# Patient Record
Sex: FEMALE | Race: WHITE | Employment: PART TIME
[De-identification: names, ages, dates, MRNs, and addresses within clinical notes are randomized per-mention and may not be internally consistent; named-entity substitution may affect disease eponyms.]

---

## 2017-02-08 ENCOUNTER — PATIENT MESSAGE (OUTPATIENT)
Dept: INTERNAL MEDICINE | Facility: CLINIC | Age: 37
End: 2017-02-08

## 2017-02-09 RX ORDER — ZOLPIDEM TARTRATE 10 MG/1
10 TABLET ORAL NIGHTLY PRN
Qty: 30 TABLET | Refills: 0 | Status: SHIPPED | OUTPATIENT
Start: 2017-02-09 | End: 2017-02-22

## 2017-03-07 ENCOUNTER — PATIENT MESSAGE (OUTPATIENT)
Dept: INTERNAL MEDICINE | Facility: CLINIC | Age: 37
End: 2017-03-07

## 2017-03-07 DIAGNOSIS — G47.00 INSOMNIA, UNSPECIFIED TYPE: Primary | ICD-10-CM

## 2017-03-07 RX ORDER — ZOLPIDEM TARTRATE 10 MG/1
TABLET ORAL
COMMUNITY
Start: 2017-01-03 | End: 2017-03-08 | Stop reason: SDUPTHER

## 2017-03-07 RX ORDER — ZOLPIDEM TARTRATE 10 MG/1
10 TABLET ORAL NIGHTLY PRN
Qty: 30 TABLET | Refills: 2 | Status: CANCELLED | OUTPATIENT
Start: 2017-03-07 | End: 2017-04-06

## 2017-03-08 RX ORDER — ZOLPIDEM TARTRATE 10 MG/1
TABLET ORAL
OUTPATIENT
Start: 2017-03-08

## 2017-03-08 RX ORDER — ZOLPIDEM TARTRATE 10 MG/1
10 TABLET ORAL NIGHTLY PRN
Qty: 30 TABLET | Refills: 0 | Status: SHIPPED | OUTPATIENT
Start: 2017-03-08 | End: 2017-04-02 | Stop reason: SDUPTHER

## 2017-03-10 ENCOUNTER — PATIENT MESSAGE (OUTPATIENT)
Dept: INTERNAL MEDICINE | Facility: CLINIC | Age: 37
End: 2017-03-10

## 2017-04-02 DIAGNOSIS — G47.00 INSOMNIA, UNSPECIFIED TYPE: ICD-10-CM

## 2017-04-03 ENCOUNTER — PATIENT MESSAGE (OUTPATIENT)
Dept: INTERNAL MEDICINE | Facility: CLINIC | Age: 37
End: 2017-04-03

## 2017-04-04 ENCOUNTER — PATIENT MESSAGE (OUTPATIENT)
Dept: PRIMARY CARE CLINIC | Age: 37
End: 2017-04-04

## 2017-04-04 RX ORDER — ZOLPIDEM TARTRATE 10 MG/1
TABLET ORAL
Qty: 30 TABLET | Refills: 0 | Status: SHIPPED | OUTPATIENT
Start: 2017-04-04

## 2018-03-15 ENCOUNTER — TELEPHONE (OUTPATIENT)
Dept: NEUROLOGY | Age: 38
End: 2018-03-15

## 2018-03-15 ENCOUNTER — OFFICE VISIT (OUTPATIENT)
Dept: NEUROLOGY | Age: 38
End: 2018-03-15
Payer: COMMERCIAL

## 2018-03-15 VITALS
WEIGHT: 133 LBS | BODY MASS INDEX: 23.57 KG/M2 | HEART RATE: 66 BPM | SYSTOLIC BLOOD PRESSURE: 125 MMHG | HEIGHT: 63 IN | DIASTOLIC BLOOD PRESSURE: 86 MMHG

## 2018-03-15 DIAGNOSIS — R51.9 ACUTE INTRACTABLE HEADACHE, UNSPECIFIED HEADACHE TYPE: ICD-10-CM

## 2018-03-15 DIAGNOSIS — S06.340S TRAUMATIC HEMORRHAGE OF RIGHT CEREBRUM WITHOUT LOSS OF CONSCIOUSNESS, SEQUELA (HCC): Primary | ICD-10-CM

## 2018-03-15 PROCEDURE — 99204 OFFICE O/P NEW MOD 45 MIN: CPT | Performed by: PSYCHIATRY & NEUROLOGY

## 2018-03-15 RX ORDER — ALPRAZOLAM 0.5 MG/1
0.5 TABLET ORAL NIGHTLY PRN
COMMUNITY

## 2018-03-15 RX ORDER — METHYLPREDNISOLONE 4 MG/1
TABLET ORAL
Qty: 1 KIT | Refills: 0 | Status: SHIPPED | OUTPATIENT
Start: 2018-03-15 | End: 2018-03-21

## 2018-03-15 NOTE — PROGRESS NOTES
showed a thrombus in the right cortical vein of Trolard. The patient was started on a heparin drip and was recommended to start anticoagulation with warfarin. She was also prophylactically placed on Keppra by neurosurgery. Patient wanted to pursue a 2nd opinion, and was transferred to the [facility]. Her images from [facility] were reviewed there. She was found have some slowing of cortical venous drainage in the right hemisphere but no acute thrombus. Clinically, patient reports that she was mostly sedated during her hospital stay because she was getting Dilaudid for her headaches. She was taken off Keppra well at the [facility]. She denies any clinical seizure activity, or any focal neurologic deficits. She does report history of a seizure-like event approximately 10 years ago that was thought to be triggered by Wellbutrin. EEG done at the [facility] did not show any epileptiform discharges. She is otherwise healthy, and has no chronic medical conditions. She does take birth control (Juanis), has been on and off it in the past and has been consistently taking it for the past 6 months. Patient denies any history family historyof aneurysm or connective tissue disease. Currently, patient reports persistent headache, that is 7/10 in severity, currently taking Tylenol with minimal relief. 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     Prior workup:  MRA and MRV without contrast 3/8/2018: No large intracranial aneurysm or high-grade central branch stenosis or occlusion was seen    Very subtle globular prominence at the anterior superior margin of the bifurcation of the right middle cerebral artery may be a artifact from tortuous vessels rather than a small 2 to 3 mm aneurysm given lack of clear visible aneurysm on the previous CT angiogram.  Consider 6 month surveillance scan   with attention to that area    Probable hypoplastic Occupational History    Physician      Dermatology locum     Social History Main Topics    Smoking status: Never Smoker    Smokeless tobacco: Never Used    Alcohol use 0.6 oz/week     1 Glasses of wine per week      Comment: occassionally    Drug use: Yes     Types: Marijuana      Comment: occassionally    Sexual activity: Not Currently     Other Topics Concern    None     Social History Narrative    None       Current Outpatient Prescriptions   Medication Sig Dispense Refill    ALPRAZolam (XANAX) 0.5 MG tablet Take 0.5 mg by mouth nightly as needed for Sleep.  Omeprazole Magnesium (PRILOSEC OTC PO) Take by mouth daily      methylPREDNISolone (MEDROL DOSEPACK) 4 MG tablet Take by mouth as directed 1 kit 0    zolpidem (AMBIEN) 10 MG tablet TAKE 1 TABLET BY MOUTH AT BEDTIME AS NEEDED for sleep 30 tablet 0    traZODone (DESYREL) 150 MG tablet 300 mg      levothyroxine (SYNTHROID) 25 MCG tablet Take 1 tablet by mouth Daily 60 tablet 3    Multiple Vitamins-Minerals (MULTIVITAMIN PO) Take by mouth daily       No current facility-administered medications for this visit.         Allergies   Allergen Reactions    Wellbutrin [Bupropion] Other (See Comments)     seizure            REVIEW OF SYSTEMS    CONSTITUTIONAL Weight: absent, Appetite: absent, Fatigue: absent      HEENT Ears: normal, Visual disturbance: absent   RESPIRATORY Shortness of breath: absent, Cough: absent   CARDIOVASCULAR Chest pain: absent, Leg swelling :absent      GI Constipation: present, Diarrhea: absent, Swallowing change: absent       Urinary frequency: absent, Urinary urgency: absent, Urinary incontinence: absent   MUSCULOSKELETAL Neck pain: absent, Back pain: absent, Stiffness: absent, Muscle pain: absent, Joint pain: absent Restless legs: absent   DERMATOLOGIC Hair loss: absent, Skin changes: absent   NEUROLOGIC Memory loss: absent, Confusion: absent, Seizures: absent Trouble walking or imbalance: absent, Dizziness: present, Proprioception normal.   Pinprick normal.     Gait, Coordination, and Reflexes     Gait  Gait: normal    Coordination   Finger to nose coordination: normal  Heel to shin coordination: normal  Tandem walking coordination: normal    Tremor   Resting tremor: absent  Intention tremor: absent  Action tremor: absent    Reflexes   Right brachioradialis: 2+  Left brachioradialis: 2+  Right biceps: 2+  Left biceps: 2+  Right triceps: 2+  Left triceps: 2+  Right patellar: 1+  Left patellar: 1+  Right achilles: 1+  Left achilles: 1+  Right plantar: normal  Left plantar: normal  Right ankle clonus: absent  Left ankle clonus: absent      Assessment:  Cerebral hemorrhage and contusion, traumatic    Plan: This is a 44 y.o. female who presents with new onset headache, nausea and vomiting. Workup showed hemorrhagic contusions in the inferior frontal lobes as well as the right frontal and temporal lobes, most consistent with trauma. There is also questionable thrombosis of the cortical vein of trolard seen on cerebral angiogram. Review of angiogram images at the [facility] clinic showed no evidence of acute thrombosis. I reviewed the patient's films today, I do not see any evidence of acute thrombosis. Will have our neuro-endovascular service review the films also. Patient would like to resume work as a dermatologist. Her neurologic exam today is normal, advised her that she may return to work next week. She would also like to fly for her friend's wedding in a few weeks, but I have advised her against this given the significant parenchymal swelling on her MRI. Will repeat neuro imaging in about 8-10 weeks. I have sent a prescription for Medrol Dosepak to her pharmacy for her headaches. Signed: Augusto Garza MD    Please note that this chart was generated using voice recognition Dragon dictation software.   Although every effort was made to ensure the accuracy of this automated transcription, some errors in

## 2018-03-16 ENCOUNTER — TELEPHONE (OUTPATIENT)
Dept: NEUROLOGY | Age: 38
End: 2018-03-16

## 2018-03-20 DIAGNOSIS — G08 ACUTE CEREBRAL VENOUS SINUS THROMBOSIS: Primary | ICD-10-CM

## 2018-04-11 ENCOUNTER — TELEPHONE (OUTPATIENT)
Dept: NEUROLOGY | Age: 38
End: 2018-04-11

## 2018-05-18 ENCOUNTER — TELEPHONE (OUTPATIENT)
Dept: NEUROLOGY | Age: 38
End: 2018-05-18

## 2018-05-29 ENCOUNTER — TELEPHONE (OUTPATIENT)
Dept: NEUROLOGY | Age: 38
End: 2018-05-29

## 2019-02-06 ENCOUNTER — APPOINTMENT (OUTPATIENT)
Age: 39
Setting detail: DERMATOLOGY
End: 2019-02-06

## 2019-02-06 DIAGNOSIS — L57.8 OTHER SKIN CHANGES DUE TO CHRONIC EXPOSURE TO NONIONIZING RADIATION: ICD-10-CM

## 2019-02-06 PROCEDURE — OTHER ICON IPL: OTHER

## 2019-02-06 ASSESSMENT — LOCATION SIMPLE DESCRIPTION DERM
LOCATION SIMPLE: RIGHT CHEEK
LOCATION SIMPLE: LEFT CHEEK

## 2019-02-06 ASSESSMENT — LOCATION DETAILED DESCRIPTION DERM
LOCATION DETAILED: LEFT CENTRAL MALAR CHEEK
LOCATION DETAILED: RIGHT CENTRAL MALAR CHEEK

## 2019-02-06 ASSESSMENT — LOCATION ZONE DERM: LOCATION ZONE: FACE

## 2019-02-06 NOTE — HPI: COSMETIC (IPL)
Have You Had Laser Removal Of Brown Spots Before?: has not had previous treatment
When Outside In The Sun, Do You...: mildly burns, tans slowly
Eye Color: sarbjit

## 2019-08-08 ENCOUNTER — APPOINTMENT (OUTPATIENT)
Age: 39
Setting detail: DERMATOLOGY
End: 2019-08-08

## 2019-08-08 DIAGNOSIS — Z41.9 ENCOUNTER FOR PROCEDURE FOR PURPOSES OTHER THAN REMEDYING HEALTH STATE, UNSPECIFIED: ICD-10-CM

## 2019-08-08 PROCEDURE — OTHER LASER HAIR REMOVAL: OTHER

## 2019-08-08 NOTE — HPI: COSMETIC (LASER HAIR REMOVAL)
Eye Color: blue
Have You Had Laser Hair Removal Before?: has not had previous treatment
When Outside In The Sun, Do You...: rarely burns, tans with ease

## 2019-08-08 NOTE — PROCEDURE: LASER HAIR REMOVAL
Fluence (Will Not Render If 0): 23
Pre-Procedure: Prior to proceeding the treatment areas were cleaned and all present put on their eye protection.
Detail Level: Zone
Number Of Prepaid Treatments (Will Not Render If 0): 0
Cooling: chill tip
Laser Type: Graciela Berman
Pulse Duration: 100 ms
Total Pulses: 10
Spot Size: 6 mm
Consent: Written consent obtained, risks reviewed including but not limited to crusting, scabbing, blistering, scarring, darker or lighter pigmentary change, paradoxical hair regrowth, incomplete removal of hair and infection.
Shaving (Optional): The patient was shaved in the office prior to the procedure
Fluence (Will Not Render If 0): 20
Fluence (Will Not Render If 0): 39
Post-Care Instructions: I reviewed with the patient in detail post-care instructions. Patient should avoid sun for a minimum of 4 weeks before and after treatment.
Location Override: sideburns
Render Post-Care In The Note: No
Pulse Duration: 20 ms
Treatment Number: 1
Post-Procedure Care: Immediate endpoint: perifollicular erythema and edema. Aloe and ice applied. Post care reviewed with patient
Tolerated Procedure (Optional): Tolerated Well

## 2019-09-05 ENCOUNTER — APPOINTMENT (OUTPATIENT)
Age: 39
Setting detail: DERMATOLOGY
End: 2019-09-05

## 2019-09-05 ENCOUNTER — RX ONLY (RX ONLY)
Age: 39
End: 2019-09-05

## 2019-09-05 DIAGNOSIS — Z41.9 ENCOUNTER FOR PROCEDURE FOR PURPOSES OTHER THAN REMEDYING HEALTH STATE, UNSPECIFIED: ICD-10-CM

## 2019-09-05 PROCEDURE — OTHER LASER HAIR REMOVAL: OTHER

## 2019-09-05 RX ORDER — TRETINOIN 0.25 MG/G
CREAM TOPICAL
Qty: 1 | Refills: 3 | Status: ERX | COMMUNITY
Start: 2019-09-05

## 2019-09-05 ASSESSMENT — LOCATION DETAILED DESCRIPTION DERM
LOCATION DETAILED: LEFT MID PREAURICULAR CHEEK
LOCATION DETAILED: RIGHT CHIN
LOCATION DETAILED: PHILTRUM
LOCATION DETAILED: RIGHT LATERAL MALAR CHEEK

## 2019-09-05 ASSESSMENT — LOCATION SIMPLE DESCRIPTION DERM
LOCATION SIMPLE: RIGHT CHEEK
LOCATION SIMPLE: CHIN
LOCATION SIMPLE: LEFT CHEEK
LOCATION SIMPLE: UPPER LIP

## 2019-09-05 ASSESSMENT — LOCATION ZONE DERM
LOCATION ZONE: LIP
LOCATION ZONE: FACE

## 2019-09-05 NOTE — PROCEDURE: LASER HAIR REMOVAL
Pre-Procedure: Prior to proceeding the treatment areas were cleaned and all present put on their eye protection.
Spot Size: 6 mm
Treatment Number: 2
Cooling: chill tip
Total Pulses: 10
Number Of Prepaid Treatments (Will Not Render If 0): 0
Pulse Duration: 100 ms
Fluence (Will Not Render If 0): 23
Shaving (Optional): The patient was shaved in the office prior to the procedure
Laser Type: Graciela Berman
Consent: Written consent obtained, risks reviewed including but not limited to crusting, scabbing, blistering, scarring, darker or lighter pigmentary change, paradoxical hair regrowth, incomplete removal of hair and infection.
Post-Care Instructions: I reviewed with the patient in detail post-care instructions. Patient should avoid sun for a minimum of 4 weeks before and after treatment.
Fluence (Will Not Render If 0): 20
Render Post-Care In The Note: No
Fluence (Will Not Render If 0): 39
Pulse Duration: 20 ms
Tolerated Procedure (Optional): Tolerated Well
Detail Level: Zone
Post-Procedure Care: Immediate endpoint: perifollicular erythema and edema. Aloe and ice applied. Post care reviewed with patient

## 2019-09-05 NOTE — HPI: COSMETIC (LASER HAIR REMOVAL)
Eye Color: blue
Have You Had Laser Hair Removal Before?: has had previous treatment
When Outside In The Sun, Do You...: rarely burns, tans with ease

## 2019-09-15 ENCOUNTER — RX ONLY (RX ONLY)
Age: 39
End: 2019-09-15

## 2019-09-15 RX ORDER — TIMOLOL MALEATE 5 MG/ML
SOLUTION/ DROPS OPHTHALMIC
Qty: 1 | Refills: 6 | Status: ERX | COMMUNITY
Start: 2019-09-15

## 2019-09-27 ENCOUNTER — RX ONLY (RX ONLY)
Age: 39
End: 2019-09-27

## 2019-09-27 ENCOUNTER — APPOINTMENT (OUTPATIENT)
Age: 39
Setting detail: DERMATOLOGY
End: 2019-09-27

## 2019-09-27 DIAGNOSIS — L70.0 ACNE VULGARIS: ICD-10-CM

## 2019-09-27 PROCEDURE — OTHER TREATMENT REGIMEN: OTHER

## 2019-09-27 PROCEDURE — OTHER PRESCRIPTION: OTHER

## 2019-09-27 PROCEDURE — OTHER COUNSELING: OTHER

## 2019-09-27 RX ORDER — DOXYCYCLINE 100 MG/1
TABLET, FILM COATED ORAL
Qty: 60 | Refills: 5 | Status: CANCELLED

## 2019-09-27 RX ORDER — DOXYCYCLINE HYCLATE 100 MG/1
CAPSULE, GELATIN COATED ORAL
Qty: 60 | Refills: 5 | Status: CANCELLED

## 2019-09-27 RX ORDER — DOXYCYCLINE 100 MG/1
TABLET, FILM COATED ORAL
Qty: 60 | Refills: 5 | Status: ERX | COMMUNITY
Start: 2019-09-27

## 2019-09-27 NOTE — PROCEDURE: TREATMENT REGIMEN
Initiate Treatment: Doxycycline 100mg BID
Continue Regimen: Tretinoin 0.25% apply thin layer to face every night as tolerated.
Detail Level: Zone

## 2019-10-02 ENCOUNTER — APPOINTMENT (OUTPATIENT)
Age: 39
Setting detail: DERMATOLOGY
End: 2019-10-03

## 2019-10-02 DIAGNOSIS — Z41.9 ENCOUNTER FOR PROCEDURE FOR PURPOSES OTHER THAN REMEDYING HEALTH STATE, UNSPECIFIED: ICD-10-CM

## 2019-10-02 PROCEDURE — OTHER LASER HAIR REMOVAL: OTHER

## 2019-10-02 ASSESSMENT — LOCATION DETAILED DESCRIPTION DERM
LOCATION DETAILED: PHILTRUM
LOCATION DETAILED: RIGHT LATERAL MALAR CHEEK
LOCATION DETAILED: RIGHT CHIN
LOCATION DETAILED: LEFT MID PREAURICULAR CHEEK

## 2019-10-02 ASSESSMENT — LOCATION SIMPLE DESCRIPTION DERM
LOCATION SIMPLE: UPPER LIP
LOCATION SIMPLE: LEFT CHEEK
LOCATION SIMPLE: RIGHT CHEEK
LOCATION SIMPLE: CHIN

## 2019-10-02 ASSESSMENT — LOCATION ZONE DERM
LOCATION ZONE: LIP
LOCATION ZONE: FACE

## 2019-10-02 NOTE — PROCEDURE: LASER HAIR REMOVAL
Number Of Prepaid Treatments (Will Not Render If 0): 0
Pulse Duration: 100 ms
Post-Care Instructions: I reviewed with the patient in detail post-care instructions. Patient should avoid sun for a minimum of 4 weeks before and after treatment.
Detail Level: Zone
Render Post-Care In The Note: No
Fluence (Will Not Render If 0): 20
Treatment Number: 3
Fluence (Will Not Render If 0): 41
Tolerated Procedure (Optional): Tolerated Well
Laser Type: Graciela Berman
Fluence (Will Not Render If 0): 23
Total Pulses: 10
Consent: Written consent obtained, risks reviewed including but not limited to crusting, scabbing, blistering, scarring, darker or lighter pigmentary change, paradoxical hair regrowth, incomplete removal of hair and infection.
Shaving (Optional): The patient was shaved in the office prior to the procedure
Spot Size: 6 mm
Cooling: chill tip
Pre-Procedure: Prior to proceeding the treatment areas were cleaned and all present put on their eye protection.
Pulse Duration: 20 ms
Post-Procedure Care: Immediate endpoint: perifollicular erythema and edema. Aloe and ice applied. Post care reviewed with patient

## 2019-10-04 ENCOUNTER — APPOINTMENT (OUTPATIENT)
Age: 39
Setting detail: DERMATOLOGY
End: 2019-10-08

## 2019-10-04 DIAGNOSIS — Z41.9 ENCOUNTER FOR PROCEDURE FOR PURPOSES OTHER THAN REMEDYING HEALTH STATE, UNSPECIFIED: ICD-10-CM

## 2019-10-04 PROCEDURE — OTHER BELOTERO INJECTION: OTHER

## 2019-10-04 PROCEDURE — OTHER JUVEDERM ULTRA PLUS XC INJECTION: OTHER

## 2019-10-04 ASSESSMENT — LOCATION DETAILED DESCRIPTION DERM
LOCATION DETAILED: LEFT MEDIAL MALAR CHEEK
LOCATION DETAILED: RIGHT LATERAL MALAR CHEEK
LOCATION DETAILED: RIGHT SUPERIOR LATERAL MALAR CHEEK
LOCATION DETAILED: LEFT SUPERIOR LATERAL MALAR CHEEK
LOCATION DETAILED: LEFT LATERAL MALAR CHEEK
LOCATION DETAILED: RIGHT SUPERIOR MEDIAL MALAR CHEEK
LOCATION DETAILED: LEFT CENTRAL MALAR CHEEK
LOCATION DETAILED: LEFT SUPERIOR MEDIAL MALAR CHEEK
LOCATION DETAILED: LEFT SUPERIOR NASAL CHEEK
LOCATION DETAILED: RIGHT INFERIOR CENTRAL MALAR CHEEK
LOCATION DETAILED: LEFT LATERAL CANTHUS
LOCATION DETAILED: RIGHT CENTRAL MALAR CHEEK
LOCATION DETAILED: RIGHT MEDIAL MALAR CHEEK

## 2019-10-04 ASSESSMENT — LOCATION ZONE DERM
LOCATION ZONE: EYELID
LOCATION ZONE: FACE

## 2019-10-04 ASSESSMENT — LOCATION SIMPLE DESCRIPTION DERM
LOCATION SIMPLE: RIGHT CHEEK
LOCATION SIMPLE: LEFT EYELID
LOCATION SIMPLE: LEFT CHEEK

## 2019-10-04 NOTE — PROCEDURE: BELOTERO INJECTION
Post-Care Instructions: Patient instructed to apply ice to reduce swelling. \\nOur post instructions given and reviewed.\\nPatient tolerated procedure well without complication.

## 2019-11-06 ENCOUNTER — APPOINTMENT (OUTPATIENT)
Age: 39
Setting detail: DERMATOLOGY
End: 2019-11-07

## 2019-11-06 DIAGNOSIS — Z41.9 ENCOUNTER FOR PROCEDURE FOR PURPOSES OTHER THAN REMEDYING HEALTH STATE, UNSPECIFIED: ICD-10-CM

## 2019-11-06 DIAGNOSIS — L57.8 OTHER SKIN CHANGES DUE TO CHRONIC EXPOSURE TO NONIONIZING RADIATION: ICD-10-CM

## 2019-11-06 PROCEDURE — OTHER ICON IPL: OTHER

## 2019-11-06 PROCEDURE — OTHER LASER HAIR REMOVAL: OTHER

## 2019-11-06 ASSESSMENT — LOCATION DETAILED DESCRIPTION DERM
LOCATION DETAILED: RIGHT LATERAL MALAR CHEEK
LOCATION DETAILED: RIGHT CHIN
LOCATION DETAILED: RIGHT CENTRAL MALAR CHEEK
LOCATION DETAILED: PHILTRUM
LOCATION DETAILED: LEFT MID PREAURICULAR CHEEK
LOCATION DETAILED: LEFT CENTRAL MALAR CHEEK

## 2019-11-06 ASSESSMENT — LOCATION SIMPLE DESCRIPTION DERM
LOCATION SIMPLE: LEFT CHEEK
LOCATION SIMPLE: CHIN
LOCATION SIMPLE: RIGHT CHEEK
LOCATION SIMPLE: UPPER LIP

## 2019-11-06 ASSESSMENT — LOCATION ZONE DERM
LOCATION ZONE: FACE
LOCATION ZONE: LIP

## 2019-11-06 NOTE — PROCEDURE: LASER HAIR REMOVAL
Treatment Number: 3
Fluence (Will Not Render If 0): 41
Post-Care Instructions: I reviewed with the patient in detail post-care instructions. Patient should avoid sun for a minimum of 4 weeks before and after treatment.
Total Pulses: 10
Treatment Number: 0
Detail Level: Zone
Pulse Duration: 20 ms
Spot Size: 6 mm
Consent: Written consent obtained, risks reviewed including but not limited to crusting, scabbing, blistering, scarring, darker or lighter pigmentary change, paradoxical hair regrowth, incomplete removal of hair and infection.
Pre-Procedure: Prior to proceeding the treatment areas were cleaned and all present put on their eye protection.
Tolerated Procedure (Optional): Tolerated Well
Render Post-Care In The Note: No
Fluence (Will Not Render If 0): 20
Fluence (Will Not Render If 0): 23
Post-Procedure Care: Immediate endpoint: perifollicular erythema and edema. Aloe and ice applied. Post care reviewed with patient
Shaving (Optional): The patient was shaved in the office prior to the procedure
Cooling: chill tip
Laser Type: Graciela Berman
Pulse Duration: 100 ms

## 2019-11-06 NOTE — HPI: COSMETIC (IPL)
Have You Had Laser Removal Of Brown Spots Before?: has had previous treatment
When Outside In The Sun, Do You...: mildly burns, tans slowly
Eye Color: blue
When Was Your Last Ipl Treatment?: 5/25/19

## 2019-12-11 ENCOUNTER — APPOINTMENT (OUTPATIENT)
Dept: URBAN - METROPOLITAN AREA CLINIC 285 | Age: 39
Setting detail: DERMATOLOGY
End: 2019-12-11

## 2019-12-11 DIAGNOSIS — L70.0 ACNE VULGARIS: ICD-10-CM

## 2019-12-11 DIAGNOSIS — Z41.9 ENCOUNTER FOR PROCEDURE FOR PURPOSES OTHER THAN REMEDYING HEALTH STATE, UNSPECIFIED: ICD-10-CM

## 2019-12-11 PROCEDURE — OTHER LASER HAIR REMOVAL: OTHER

## 2019-12-11 PROCEDURE — OTHER STARLUX IPL: OTHER

## 2019-12-11 ASSESSMENT — LOCATION DETAILED DESCRIPTION DERM
LOCATION DETAILED: INFERIOR MID FOREHEAD
LOCATION DETAILED: LEFT INFERIOR CENTRAL MALAR CHEEK
LOCATION DETAILED: RIGHT LATERAL MALAR CHEEK
LOCATION DETAILED: PHILTRUM
LOCATION DETAILED: LEFT SUPERIOR PREAURICULAR CHEEK
LOCATION DETAILED: RIGHT INFERIOR CENTRAL MALAR CHEEK

## 2019-12-11 ASSESSMENT — LOCATION ZONE DERM
LOCATION ZONE: LIP
LOCATION ZONE: FACE

## 2019-12-11 ASSESSMENT — LOCATION SIMPLE DESCRIPTION DERM
LOCATION SIMPLE: INFERIOR FOREHEAD
LOCATION SIMPLE: RIGHT CHEEK
LOCATION SIMPLE: UPPER LIP
LOCATION SIMPLE: LEFT CHEEK

## 2019-12-11 NOTE — PROCEDURE: LASER HAIR REMOVAL
Detail Level: Detailed
Fluence (Will Not Render If 0): 20
Fluence (Will Not Render If 0): 43
Post-Care Instructions: I reviewed with the patient in detail post-care instructions. Patient should avoid sun for a minimum of 4 weeks before and after treatment.
Render Post-Care In The Note: No
Total Pulses: 6
Location Override: sideburns
Post-Procedure Care: Immediate endpoint: perifollicular erythema and edema. Vaseline and ice applied. Post care reviewed with patient.
Tolerated Procedure (Optional): Tolerated Well
Treatment Number: 0
Pulse Duration: 100 ms
Pre-Procedure: Prior to proceeding the treatment areas were cleaned and all present put on their eye protection.
Laser Type: Graciela Berman
Shaving (Optional): The patient shaved at home
Consent: Written consent obtained, risks reviewed including but not limited to crusting, scabbing, blistering, scarring, darker or lighter pigmentary change, paradoxical hair regrowth, incomplete removal of hair and infection.

## 2019-12-13 ENCOUNTER — RX ONLY (RX ONLY)
Age: 39
End: 2019-12-13

## 2019-12-27 ENCOUNTER — RX ONLY (RX ONLY)
Age: 39
End: 2019-12-27

## 2019-12-27 RX ORDER — ADAPALENE AND BENZOYL PEROXIDE 3; 25 MG/G; MG/G
GEL TOPICAL
Qty: 1 | Refills: 2 | Status: ERX

## 2019-12-27 RX ORDER — ADAPALENE AND BENZOYL PEROXIDE 3; 25 MG/G; MG/G
GEL TOPICAL
Qty: 1 | Refills: 2 | Status: ERX | COMMUNITY
Start: 2019-12-27

## 2020-01-11 ENCOUNTER — RX ONLY (RX ONLY)
Age: 40
End: 2020-01-11

## 2020-01-18 ENCOUNTER — RX ONLY (RX ONLY)
Age: 40
End: 2020-01-18

## 2020-01-18 RX ORDER — BRIMONIDINE TARTRATE 2 MG/ML
SOLUTION/ DROPS OPHTHALMIC
Qty: 1 | Refills: 1 | Status: ERX | COMMUNITY
Start: 2020-01-18

## 2020-02-15 ENCOUNTER — RX ONLY (RX ONLY)
Age: 40
End: 2020-02-15

## 2020-03-03 ENCOUNTER — RX ONLY (RX ONLY)
Age: 40
End: 2020-03-03

## 2020-03-04 ENCOUNTER — APPOINTMENT (OUTPATIENT)
Dept: URBAN - METROPOLITAN AREA CLINIC 285 | Age: 40
Setting detail: DERMATOLOGY
End: 2020-03-04

## 2020-03-04 DIAGNOSIS — L70.0 ACNE VULGARIS: ICD-10-CM

## 2020-03-04 PROCEDURE — OTHER COUNSELING: OTHER

## 2020-03-04 PROCEDURE — OTHER TREATMENT REGIMEN: OTHER

## 2020-03-04 ASSESSMENT — LOCATION DETAILED DESCRIPTION DERM
LOCATION DETAILED: RIGHT SUPERIOR MEDIAL BUCCAL CHEEK
LOCATION DETAILED: LEFT INFERIOR CENTRAL MALAR CHEEK
LOCATION DETAILED: LEFT CHIN

## 2020-03-04 ASSESSMENT — LOCATION SIMPLE DESCRIPTION DERM
LOCATION SIMPLE: RIGHT CHEEK
LOCATION SIMPLE: CHIN
LOCATION SIMPLE: LEFT CHEEK

## 2020-03-04 ASSESSMENT — LOCATION ZONE DERM: LOCATION ZONE: FACE

## 2020-03-04 NOTE — PROCEDURE: TREATMENT REGIMEN
Initiate Treatment: Increase Spironolactone 100mg bid
Continue Regimen: Epiduo forte qpm\\nMaxaalt for migraines
Discontinue Regimen: Spironolactone 50mg bid
Detail Level: Zone

## 2020-03-04 NOTE — PROCEDURE: COUNSELING
Dapsone Counseling: I discussed with the patient the risks of dapsone including but not limited to hemolytic anemia, agranulocytosis, rashes, methemoglobinemia, kidney failure, peripheral neuropathy, headaches, GI upset, and liver toxicity.  Patients who start dapsone require monitoring including baseline LFTs and weekly CBCs for the first month, then every month thereafter.  The patient verbalized understanding of the proper use and possible adverse effects of dapsone.  All of the patient's questions and concerns were addressed.
Isotretinoin Counseling: Patient should get monthly blood tests, not donate blood, not drive at night if vision affected, not share medication, and not undergo elective surgery for 6 months after tx completed. Side effects reviewed, pt to contact office should one occur.
Include Pregnancy/Lactation Warning?: No
Isotretinoin Pregnancy And Lactation Text: This medication is Pregnancy Category X and is considered extremely dangerous during pregnancy. It is unknown if it is excreted in breast milk.
Spironolactone Pregnancy And Lactation Text: This medication can cause feminization of the male fetus and should be avoided during pregnancy. The active metabolite is also found in breast milk.
Tazorac Counseling:  Patient advised that medication is irritating and drying.  Patient may need to apply sparingly and wash off after an hour before eventually leaving it on overnight.  The patient verbalized understanding of the proper use and possible adverse effects of tazorac.  All of the patient's questions and concerns were addressed.
Spironolactone Counseling: Patient advised regarding risks of diarrhea, abdominal pain, hyperkalemia, birth defects (for female patients), liver toxicity and renal toxicity. The patient may need blood work to monitor liver and kidney function and potassium levels while on therapy. The patient verbalized understanding of the proper use and possible adverse effects of spironolactone.  All of the patient's questions and concerns were addressed.
Erythromycin Pregnancy And Lactation Text: This medication is Pregnancy Category B and is considered safe during pregnancy. It is also excreted in breast milk.
Topical Sulfur Applications Pregnancy And Lactation Text: This medication is Pregnancy Category C and has an unknown safety profile during pregnancy. It is unknown if this topical medication is excreted in breast milk.
Doxycycline Pregnancy And Lactation Text: This medication is Pregnancy Category D and not consider safe during pregnancy. It is also excreted in breast milk but is considered safe for shorter treatment courses.
Topical Retinoid counseling:  Patient advised to apply a pea-sized amount only at bedtime and wait 30 minutes after washing their face before applying.  If too drying, patient may add a non-comedogenic moisturizer. The patient verbalized understanding of the proper use and possible adverse effects of retinoids.  All of the patient's questions and concerns were addressed.
Tazorac Pregnancy And Lactation Text: This medication is not safe during pregnancy. It is unknown if this medication is excreted in breast milk.
Benzoyl Peroxide Counseling: Patient counseled that medicine may cause skin irritation and bleach clothing.  In the event of skin irritation, the patient was advised to reduce the amount of the drug applied or use it less frequently.   The patient verbalized understanding of the proper use and possible adverse effects of benzoyl peroxide.  All of the patient's questions and concerns were addressed.
Azithromycin Pregnancy And Lactation Text: This medication is considered safe during pregnancy and is also secreted in breast milk.
Minocycline Pregnancy And Lactation Text: This medication is Pregnancy Category D and not consider safe during pregnancy. It is also excreted in breast milk.
High Dose Vitamin A Pregnancy And Lactation Text: High dose vitamin A therapy is contraindicated during pregnancy and breast feeding.
Dapsone Pregnancy And Lactation Text: This medication is Pregnancy Category C and is not considered safe during pregnancy or breast feeding.
Topical Clindamycin Pregnancy And Lactation Text: This medication is Pregnancy Category B and is considered safe during pregnancy. It is unknown if it is excreted in breast milk.
Azithromycin Counseling:  I discussed with the patient the risks of azithromycin including but not limited to GI upset, allergic reaction, drug rash, diarrhea, and yeast infections.
Topical Retinoid Pregnancy And Lactation Text: This medication is Pregnancy Category C. It is unknown if this medication is excreted in breast milk.
Topical Sulfur Applications Counseling: Topical Sulfur Counseling: Patient counseled that this medication may cause skin irritation or allergic reactions.  In the event of skin irritation, the patient was advised to reduce the amount of the drug applied or use it less frequently.   The patient verbalized understanding of the proper use and possible adverse effects of topical sulfur application.  All of the patient's questions and concerns were addressed.
Topical Clindamycin Counseling: Patient counseled that this medication may cause skin irritation or allergic reactions.  In the event of skin irritation, the patient was advised to reduce the amount of the drug applied or use it less frequently.   The patient verbalized understanding of the proper use and possible adverse effects of clindamycin.  All of the patient's questions and concerns were addressed.
Birth Control Pills Counseling: Birth Control Pill Counseling: I discussed with the patient the potential side effects of OCPs including but not limited to increased risk of stroke, heart attack, thrombophlebitis, deep venous thrombosis, hepatic adenomas, breast changes, GI upset, headaches, and depression.  The patient verbalized understanding of the proper use and possible adverse effects of OCPs. All of the patient's questions and concerns were addressed.
Doxycycline Counseling:  Patient counseled regarding possible photosensitivity and increased risk for sunburn.  Patient instructed to avoid sunlight, if possible.  When exposed to sunlight, patients should wear protective clothing, sunglasses, and sunscreen.  The patient was instructed to call the office immediately if the following severe adverse effects occur:  hearing changes, easy bruising/bleeding, severe headache, or vision changes.  The patient verbalized understanding of the proper use and possible adverse effects of doxycycline.  All of the patient's questions and concerns were addressed.
Erythromycin Counseling:  I discussed with the patient the risks of erythromycin including but not limited to GI upset, allergic reaction, drug rash, diarrhea, increase in liver enzymes, and yeast infections.
Detail Level: Zone
High Dose Vitamin A Counseling: Side effects reviewed, pt to contact office should one occur.
Minocycline Counseling: Patient advised regarding possible photosensitivity and discoloration of the teeth, skin, lips, tongue and gums.  Patient instructed to avoid sunlight, if possible.  When exposed to sunlight, patients should wear protective clothing, sunglasses, and sunscreen.  The patient was instructed to call the office immediately if the following severe adverse effects occur:  hearing changes, easy bruising/bleeding, severe headache, or vision changes.  The patient verbalized understanding of the proper use and possible adverse effects of minocycline.  All of the patient's questions and concerns were addressed.
Bactrim Counseling:  I discussed with the patient the risks of sulfa antibiotics including but not limited to GI upset, allergic reaction, drug rash, diarrhea, dizziness, photosensitivity, and yeast infections.  Rarely, more serious reactions can occur including but not limited to aplastic anemia, agranulocytosis, methemoglobinemia, blood dyscrasias, liver or kidney failure, lung infiltrates or desquamative/blistering drug rashes.
Benzoyl Peroxide Pregnancy And Lactation Text: This medication is Pregnancy Category C. It is unknown if benzoyl peroxide is excreted in breast milk.
Bactrim Pregnancy And Lactation Text: This medication is Pregnancy Category D and is known to cause fetal risk.  It is also excreted in breast milk.
Birth Control Pills Pregnancy And Lactation Text: This medication should be avoided if pregnant and for the first 30 days post-partum.
Tetracycline Counseling: Patient counseled regarding possible photosensitivity and increased risk for sunburn.  Patient instructed to avoid sunlight, if possible.  When exposed to sunlight, patients should wear protective clothing, sunglasses, and sunscreen.  The patient was instructed to call the office immediately if the following severe adverse effects occur:  hearing changes, easy bruising/bleeding, severe headache, or vision changes.  The patient verbalized understanding of the proper use and possible adverse effects of tetracycline.  All of the patient's questions and concerns were addressed. Patient understands to avoid pregnancy while on therapy due to potential birth defects.

## 2020-03-12 ENCOUNTER — APPOINTMENT (OUTPATIENT)
Dept: URBAN - METROPOLITAN AREA CLINIC 285 | Age: 40
Setting detail: DERMATOLOGY
End: 2020-03-12

## 2020-03-12 DIAGNOSIS — L70.0 ACNE VULGARIS: ICD-10-CM

## 2020-03-12 PROCEDURE — OTHER STARLUX IPL: OTHER

## 2020-03-12 ASSESSMENT — LOCATION ZONE DERM: LOCATION ZONE: FACE

## 2020-03-12 ASSESSMENT — LOCATION SIMPLE DESCRIPTION DERM
LOCATION SIMPLE: RIGHT CHEEK
LOCATION SIMPLE: LEFT CHEEK
LOCATION SIMPLE: INFERIOR FOREHEAD

## 2020-03-12 ASSESSMENT — LOCATION DETAILED DESCRIPTION DERM
LOCATION DETAILED: INFERIOR MID FOREHEAD
LOCATION DETAILED: LEFT INFERIOR CENTRAL MALAR CHEEK
LOCATION DETAILED: RIGHT INFERIOR CENTRAL MALAR CHEEK

## 2020-03-14 ENCOUNTER — RX ONLY (RX ONLY)
Age: 40
End: 2020-03-14

## 2020-03-15 ENCOUNTER — RX ONLY (RX ONLY)
Age: 40
End: 2020-03-15

## 2020-03-23 ENCOUNTER — RX ONLY (RX ONLY)
Age: 40
End: 2020-03-23

## 2020-04-13 ENCOUNTER — RX ONLY (RX ONLY)
Age: 40
End: 2020-04-13

## 2020-05-01 ENCOUNTER — RX ONLY (RX ONLY)
Age: 40
End: 2020-05-01

## 2020-05-13 ENCOUNTER — RX ONLY (RX ONLY)
Age: 40
End: 2020-05-13

## 2020-05-13 RX ORDER — ADAPALENE AND BENZOYL PEROXIDE 3; 25 MG/G; MG/G
GEL TOPICAL
Qty: 1 | Refills: 1 | Status: ERX

## 2020-05-23 ENCOUNTER — RX ONLY (RX ONLY)
Age: 40
End: 2020-05-23

## 2020-05-23 RX ORDER — TRIAMCINOLONE ACETONIDE 1 MG/G
OINTMENT TOPICAL
Qty: 1 | Refills: 1 | Status: ERX | COMMUNITY
Start: 2020-05-23

## 2020-05-23 RX ORDER — BETAMETHASONE DIPROPIONATE 0.5 MG/G
OINTMENT TOPICAL
Qty: 1 | Refills: 2 | Status: ERX | COMMUNITY
Start: 2020-05-23

## 2020-05-23 RX ORDER — CLOBETASOL PROPIONATE 0.5 MG/G
OINTMENT TOPICAL
Qty: 1 | Refills: 2 | Status: ERX | COMMUNITY
Start: 2020-05-23

## 2020-05-25 ENCOUNTER — RX ONLY (RX ONLY)
Age: 40
End: 2020-05-25

## 2020-07-22 ENCOUNTER — RX ONLY (RX ONLY)
Age: 40
End: 2020-07-22

## 2020-07-22 RX ORDER — DROSPIRENONE AND ETHINYL ESTRADIOL 0.02-3(28)
KIT ORAL
Qty: 3 | Refills: 3 | Status: ERX

## 2020-08-28 ENCOUNTER — APPOINTMENT (OUTPATIENT)
Dept: URBAN - METROPOLITAN AREA CLINIC 285 | Age: 40
Setting detail: DERMATOLOGY
End: 2020-08-28

## 2020-08-28 DIAGNOSIS — Z41.9 ENCOUNTER FOR PROCEDURE FOR PURPOSES OTHER THAN REMEDYING HEALTH STATE, UNSPECIFIED: ICD-10-CM

## 2020-08-28 PROCEDURE — OTHER LASER HAIR REMOVAL: OTHER

## 2020-08-28 NOTE — HPI: COSMETIC (LASER HAIR REMOVAL)
Eye Color: blue
Have You Had Laser Hair Removal Before?: has had previous treatment
When Outside In The Sun, Do You...: mildly burns, tans slowly
When Was Your Last Laser Treatment?: 07/15/2020
Number Of Treatments: 1

## 2020-08-28 NOTE — PROCEDURE: LASER HAIR REMOVAL
Eye Shield Text: Given the treatment area eye shields were inserted prior to treatment.
Treatment Number: 0
External Cooling: SmartCool
Fluence (Will Not Render If 0): 23
Tolerated Procedure (Optional): Tolerated Well
Post-Procedure Care: Immediate endpoint: perifollicular erythema and edema. Vaseline and ice applied. Post care reviewed with patient.
Detail Level: Simple
Pulse Duration: 20 ms
Fluence (Will Not Render If 0): 20
Consent: Written consent obtained, risks reviewed including but not limited to crusting, scabbing, blistering, scarring, darker or lighter pigmentary change, paradoxical hair regrowth, incomplete removal of hair and infection.
Fluence (Will Not Render If 0): 42
Cooling: DCD setting
Total Pulses: 313
Cooling: chill tip
Treatment Number: 1
Render Post-Care In The Note: No
Pre-Procedure: Prior to proceeding the treatment areas were cleaned and all present put on their eye protection.
Post-Care Instructions: I reviewed with the patient in detail post-care instructions. Patient should avoid sun for a minimum of 4 weeks before and after treatment.
Pulse Duration: 100 ms
Laser Type: Graciela Berman
Shaving (Optional): The patient shaved at home

## 2020-09-24 ENCOUNTER — APPOINTMENT (OUTPATIENT)
Dept: URBAN - METROPOLITAN AREA CLINIC 285 | Age: 40
Setting detail: DERMATOLOGY
End: 2020-09-25

## 2020-09-24 DIAGNOSIS — Z41.9 ENCOUNTER FOR PROCEDURE FOR PURPOSES OTHER THAN REMEDYING HEALTH STATE, UNSPECIFIED: ICD-10-CM

## 2020-09-24 PROCEDURE — OTHER LASER HAIR REMOVAL: OTHER

## 2020-09-24 NOTE — HPI: COSMETIC (LASER HAIR REMOVAL)
Have You Had Laser Hair Removal Before?: has had previous treatment
When Outside In The Sun, Do You...: rarely burns, tans with ease
Number Of Treatments: 1

## 2020-09-24 NOTE — PROCEDURE: LASER HAIR REMOVAL
Tolerated Procedure (Optional): Tolerated Well
Post-Care Instructions: I reviewed with the patient in detail post-care instructions. Patient should avoid sun for a minimum of 4 weeks before and after treatment.
External Cooling Fan Speed: 0
Fluence (Will Not Render If 0): 23
Were Eye Shields Employed?: No
Pulse Duration: 100 ms
Total Pulses: 313
Pre-Procedure: Prior to proceeding the treatment areas were cleaned and all present put on their eye protection.
Fluence (Will Not Render If 0): 20
Shaving (Optional): The patient shaved at home
Eye Shield Text: Given the treatment area eye shields were inserted prior to treatment.
Treatment Number: 1
Detail Level: Simple
Laser Type: Graciela Berman
External Cooling: SmartCool
Cooling: DCD setting
Pulse Duration: 20 ms
Consent: Written consent obtained, risks reviewed including but not limited to crusting, scabbing, blistering, scarring, darker or lighter pigmentary change, paradoxical hair regrowth, incomplete removal of hair and infection.
Fluence (Will Not Render If 0): 42
Post-Procedure Care: Immediate endpoint: perifollicular erythema and edema. Vaseline and ice applied. Post care reviewed with patient.
Cooling: chill tip

## 2020-10-13 ENCOUNTER — RX ONLY (RX ONLY)
Age: 40
End: 2020-10-13

## 2020-10-13 RX ORDER — DROSPIRENONE AND ETHINYL ESTRADIOL 0.03MG-3MG
KIT ORAL
Qty: 3 | Refills: 4 | Status: ERX | COMMUNITY
Start: 2020-10-13

## 2020-10-20 LAB
CHOLESTEROL, TOTAL: 175 MG/DL
CHOLESTEROL/HDL RATIO: 2.6
HDLC SERPL-MCNC: 68 MG/DL (ref 35–70)
LDL CHOLESTEROL CALCULATED: 100 MG/DL (ref 0–160)
NONHDLC SERPL-MCNC: NORMAL MG/DL
TRIGL SERPL-MCNC: 37 MG/DL
VLDLC SERPL CALC-MCNC: 7 MG/DL

## 2023-02-13 ENCOUNTER — HOSPITAL ENCOUNTER (EMERGENCY)
Age: 43
Discharge: HOME | End: 2023-02-13
Attending: EMERGENCY MEDICINE
Payer: COMMERCIAL

## 2023-02-13 ENCOUNTER — APPOINTMENT (OUTPATIENT)
Dept: GENERAL RADIOLOGY | Age: 43
End: 2023-02-13
Payer: COMMERCIAL

## 2023-02-13 VITALS
HEART RATE: 68 BPM | OXYGEN SATURATION: 100 % | SYSTOLIC BLOOD PRESSURE: 131 MMHG | RESPIRATION RATE: 18 BRPM | DIASTOLIC BLOOD PRESSURE: 105 MMHG | TEMPERATURE: 98 F

## 2023-02-13 DIAGNOSIS — Z71.1 FEARED CONDITION NOT DEMONSTRATED: ICD-10-CM

## 2023-02-13 DIAGNOSIS — R06.02 SHORTNESS OF BREATH: Primary | ICD-10-CM

## 2023-02-13 LAB
A/G RATIO: 1.7 (ref 1.1–2.2)
ALBUMIN SERPL-MCNC: 4.7 G/DL (ref 3.4–5)
ALP BLD-CCNC: 91 U/L (ref 40–129)
ALT SERPL-CCNC: 21 U/L (ref 10–40)
ANION GAP SERPL CALCULATED.3IONS-SCNC: 8 MMOL/L (ref 3–16)
AST SERPL-CCNC: 39 U/L (ref 15–37)
BASE EXCESS VENOUS: 3 MMOL/L (ref -3–3)
BASOPHILS ABSOLUTE: 0 K/UL (ref 0–0.2)
BASOPHILS RELATIVE PERCENT: 0.5 %
BILIRUB SERPL-MCNC: 0.7 MG/DL (ref 0–1)
BUN BLDV-MCNC: 13 MG/DL (ref 7–20)
CALCIUM SERPL-MCNC: 10.4 MG/DL (ref 8.3–10.6)
CARBOXYHEMOGLOBIN: 2.6 % (ref 0–1.5)
CHLORIDE BLD-SCNC: 101 MMOL/L (ref 99–110)
CO2: 25 MMOL/L (ref 21–32)
CREAT SERPL-MCNC: 0.9 MG/DL (ref 0.6–1.1)
D DIMER: 0.43 UG/ML FEU (ref 0–0.6)
EKG ATRIAL RATE: 52 BPM
EKG DIAGNOSIS: NORMAL
EKG P AXIS: 25 DEGREES
EKG P-R INTERVAL: 112 MS
EKG Q-T INTERVAL: 474 MS
EKG QRS DURATION: 86 MS
EKG QTC CALCULATION (BAZETT): 440 MS
EKG R AXIS: 35 DEGREES
EKG T AXIS: 59 DEGREES
EKG VENTRICULAR RATE: 52 BPM
EOSINOPHILS ABSOLUTE: 0 K/UL (ref 0–0.6)
EOSINOPHILS RELATIVE PERCENT: 0.4 %
GFR SERPL CREATININE-BSD FRML MDRD: >60 ML/MIN/{1.73_M2}
GLUCOSE BLD-MCNC: 140 MG/DL (ref 70–99)
HCG QUALITATIVE: NEGATIVE
HCO3 VENOUS: 27.2 MMOL/L (ref 23–29)
HCT VFR BLD CALC: 44.6 % (ref 36–48)
HEMOGLOBIN, VEN, REDUCED: 36 %
HEMOGLOBIN: 14.9 G/DL (ref 12–16)
LYMPHOCYTES ABSOLUTE: 1.9 K/UL (ref 1–5.1)
LYMPHOCYTES RELATIVE PERCENT: 19.4 %
MCH RBC QN AUTO: 30.6 PG (ref 26–34)
MCHC RBC AUTO-ENTMCNC: 33.4 G/DL (ref 31–36)
MCV RBC AUTO: 91.6 FL (ref 80–100)
METHEMOGLOBIN VENOUS: 0.8 %
MONOCYTES ABSOLUTE: 0.5 K/UL (ref 0–1.3)
MONOCYTES RELATIVE PERCENT: 5 %
NEUTROPHILS ABSOLUTE: 7.2 K/UL (ref 1.7–7.7)
NEUTROPHILS RELATIVE PERCENT: 74.7 %
O2 CONTENT, VEN: 12 VOL %
O2 SAT, VEN: 63 %
O2 THERAPY: ABNORMAL
PCO2, VEN: 39.5 MMHG (ref 40–50)
PDW BLD-RTO: 13.9 % (ref 12.4–15.4)
PH VENOUS: 7.45 (ref 7.35–7.45)
PLATELET # BLD: 285 K/UL (ref 135–450)
PMV BLD AUTO: 6.9 FL (ref 5–10.5)
PO2, VEN: 32.6 MMHG (ref 25–40)
POTASSIUM REFLEX MAGNESIUM: 4.2 MMOL/L (ref 3.5–5.1)
PRO-BNP: 105 PG/ML (ref 0–124)
RBC # BLD: 4.87 M/UL (ref 4–5.2)
SODIUM BLD-SCNC: 134 MMOL/L (ref 136–145)
TCO2 CALC VENOUS: 64 MMOL/L
TOTAL PROTEIN: 7.4 G/DL (ref 6.4–8.2)
TROPONIN: <0.01 NG/ML
WBC # BLD: 9.7 K/UL (ref 4–11)

## 2023-02-13 PROCEDURE — 80053 COMPREHEN METABOLIC PANEL: CPT

## 2023-02-13 PROCEDURE — 85379 FIBRIN DEGRADATION QUANT: CPT

## 2023-02-13 PROCEDURE — 84703 CHORIONIC GONADOTROPIN ASSAY: CPT

## 2023-02-13 PROCEDURE — 71045 X-RAY EXAM CHEST 1 VIEW: CPT

## 2023-02-13 PROCEDURE — 99285 EMERGENCY DEPT VISIT HI MDM: CPT

## 2023-02-13 PROCEDURE — 84484 ASSAY OF TROPONIN QUANT: CPT

## 2023-02-13 PROCEDURE — 6370000000 HC RX 637 (ALT 250 FOR IP): Performed by: EMERGENCY MEDICINE

## 2023-02-13 PROCEDURE — 85025 COMPLETE CBC W/AUTO DIFF WBC: CPT

## 2023-02-13 PROCEDURE — 93010 ELECTROCARDIOGRAM REPORT: CPT | Performed by: INTERNAL MEDICINE

## 2023-02-13 PROCEDURE — 82803 BLOOD GASES ANY COMBINATION: CPT

## 2023-02-13 PROCEDURE — 83880 ASSAY OF NATRIURETIC PEPTIDE: CPT

## 2023-02-13 PROCEDURE — 93005 ELECTROCARDIOGRAM TRACING: CPT | Performed by: EMERGENCY MEDICINE

## 2023-02-13 RX ORDER — RIMEGEPANT SULFATE 75 MG/75MG
75 TABLET, ORALLY DISINTEGRATING ORAL
COMMUNITY

## 2023-02-13 RX ORDER — HYDROXYZINE PAMOATE 25 MG/1
50 CAPSULE ORAL ONCE
Status: COMPLETED | OUTPATIENT
Start: 2023-02-13 | End: 2023-02-13

## 2023-02-13 RX ORDER — NARATRIPTAN 2.5 MG/1
2.5 TABLET ORAL
COMMUNITY

## 2023-02-13 RX ORDER — LORAZEPAM 1 MG/1
1 TABLET ORAL ONCE
Status: COMPLETED | OUTPATIENT
Start: 2023-02-13 | End: 2023-02-13

## 2023-02-13 RX ADMIN — HYDROXYZINE PAMOATE 50 MG: 25 CAPSULE ORAL at 14:28

## 2023-02-13 RX ADMIN — LORAZEPAM 1 MG: 1 TABLET ORAL at 13:44

## 2023-02-13 ASSESSMENT — PAIN SCALES - GENERAL: PAINLEVEL_OUTOF10: 0

## 2023-02-13 ASSESSMENT — PAIN - FUNCTIONAL ASSESSMENT: PAIN_FUNCTIONAL_ASSESSMENT: 0-10

## 2023-02-13 NOTE — ED TRIAGE NOTES
Patient states took 5 MG Propranolol this morning because she felt like her heart was racing. Patient appears very anxious at time of triage.

## 2023-02-13 NOTE — ED TRIAGE NOTES
Patient states sometimes when she wakes up from naps she feels \"frazzled and frantic\". States doesn't take naps often due to feeling like this when she wakes up.

## 2023-02-13 NOTE — LETTER
[facility] Emergency Department  [address] [id number]  Phone: [phone]    No name on file. February 13, 2023     Patient: Yari Martini   YOB: 1980   Date of Visit: 2/13/2023       To Whom It May Concern: It is my medical opinion that Yari Martini may return to work on 2/15/23. If you have any questions or concerns, please don't hesitate to call.     Sincerely,      Dr Marcial Barbour

## 2023-02-13 NOTE — ED PROVIDER NOTES
[facility]) Emergency [address] [facility]    Katie Mock MD, am the primary clinician of record. CHIEF COMPLAINT  Chief Complaint   Patient presents with    Illness     Patient states woke up this morning generally not feeling well. States went to work and pulse ox read \"70's-80\"s. Patient denies CP or SOB, is 100% on RA at time of triage. Patient breathing heavy. HISTORY OF PRESENT ILLNESS  Caralee Spurling is a 36 y.o. female  who presents to the ED complaining of onset of shortness of breath this morning. She is a little tachypneic in triage and reports that she checked her pulse oximeter reading prior to coming in and found it to be in the 70-80% range. 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 She does long car rides but has no leg swelling or calf pain, history of DVT or PE and denies any chest pain or discomfort associated with her shortness of breath. She has not been wheezing. Yesterday she had some questionable food followed by an episode of vomiting and diarrhea however has had essentially complete resolution of those types of symptoms and her shortness of breath did not start until today. She has not had any cough or cold symptoms. No belly pain. She does have a history of anxiety and depression and panic attacks. However, she says that this feels somewhat different than her typical panic attack because she does not currently feel lightheaded. She has not had a fever. No other complaints, modifying factors or associated symptoms. I have reviewed the following from the nursing documentation.     Past Medical History:   Diagnosis Date    Anxiety and depression     Bleeding in brain ([address] 2018    GERD (gastroesophageal reflux disease)     Hypothyroidism     Insomnia      Past Surgical History:   Procedure Laterality Date    ANTERIOR CRUCIATE LIGAMENT REPAIR Left [address] [facility]    for scoliosis    BUNIONECTOMY Bilateral 2005    BUNIONECTOMY Left 2015    TIBIA FRACTURE SURGERY Left 1998    WISDOM TOOTH EXTRACTION       Family History   Problem Relation Age of Onset    Depression Mother     Anxiety Disorder Mother     Heart Disease Paternal Grandfather      Social History     Socioeconomic History    Marital status: Single     Spouse name: Not on file    Number of children: Not on file    Years of education: Not on file    Highest education level: Not on file   Occupational History    Occupation: Physician     Comment: Dermatology locum   Tobacco Use    Smoking status: Never    Smokeless tobacco: Never   Substance and Sexual Activity    Alcohol use: Yes     Alcohol/week: 1.0 standard drink     Types: 1 Glasses of wine per week     Comment: occassionally    Drug use: Yes     Types: Marijuana Jeffery Deniz)     Comment: occassionally    Sexual activity: Not Currently   Other Topics Concern    Not on file   Social History Narrative    Not on file     Social Determinants of Health     Financial Resource Strain: Not on file   Food Insecurity: Not on file   Transportation Needs: Not on file   Physical Activity: Not on file   Stress: Not on file   Social Connections: Not on file   Intimate Partner Violence: Not on file   Housing Stability: Not on file     No current facility-administered medications for this encounter. Current Outpatient Medications   Medication Sig Dispense Refill    Rimegepant Sulfate (NURTEC) 75 MG TBDP Take 75 mg by mouth      naratriptan (AMERGE) 2.5 MG tablet Take 2.5 mg by mouth once as needed for Migraine 2.5 mg at onset of headache, may repeat in 4 hours if needed      ALPRAZolam (XANAX) 0.5 MG tablet Take 0.5 mg by mouth nightly as needed for Sleep.       Omeprazole Magnesium (PRILOSEC OTC PO) Take by mouth daily      zolpidem (AMBIEN) 10 MG tablet TAKE 1 TABLET BY MOUTH AT BEDTIME AS NEEDED for sleep (Patient not taking: Reported on 2/13/2023) 30 tablet 0    traZODone (DESYREL) 150 MG tablet 300 mg levothyroxine (SYNTHROID) 25 MCG tablet Take 1 tablet by mouth Daily 60 tablet 3    Multiple Vitamins-Minerals (MULTIVITAMIN PO) Take by mouth daily       Allergies   Allergen Reactions    Wellbutrin [Bupropion] Other (See Comments)     seizure       REVIEW OF SYSTEMS  10 systems reviewed, pertinent positives per HPI otherwise noted to be negative. PHYSICAL EXAM  BP (!) 131/105   Pulse 68   Temp 98 °F (36.7 °C) (Oral)   Resp 18   LMP 02/11/2023   SpO2 100%    GENERAL APPEARANCE: Awake and alert. Cooperative. No distress. HENT: Normocephalic. Atraumatic. Mucous membranes are moist.  NECK: Supple. EYES: PERRL. EOM's grossly intact. HEART/CHEST: RRR. No murmurs. No chest wall tenderness. LUNGS: Respirations shallow and mildly tachypneic but not labored. CTAB without wheezing rhonchi or rales. Good air exchange. Speaking comfortably in full sentences. ABDOMEN: No tenderness. Soft. Non-distended. No masses. No organomegaly. No guarding or rebound. Normal bowel sounds throughout. MUSCULOSKELETAL: No extremity edema. Compartments soft. No deformity. No tenderness in the extremities. All extremities neurovascularly intact. SKIN: Warm and dry. No acute rashes. NEUROLOGICAL: Alert and oriented. CN's 2-12 intact. No gross facial drooping. Strength 5/5, sensation intact. 2 plus DTR's in knees bilaterally. Gait normal.  PSYCHIATRIC: Normal mood and affect. LABS  I have personally reviewed all labs for this visit.    Results for orders placed or performed during the hospital encounter of 02/13/23   CBC with Auto Differential   Result Value Ref Range    WBC 9.7 4.0 - 11.0 K/uL    RBC 4.87 4.00 - 5.20 M/uL    Hemoglobin 14.9 12.0 - 16.0 g/dL    Hematocrit 44.6 36.0 - 48.0 %    MCV 91.6 80.0 - 100.0 fL    MCH 30.6 26.0 - 34.0 pg    MCHC 33.4 31.0 - 36.0 g/dL    RDW 13.9 12.4 - 15.4 %    Platelets 546 860 - 873 K/uL    MPV 6.9 5.0 - 10.5 fL    Neutrophils % 74.7 %    Lymphocytes % 19.4 %    Monocytes % 5.0 %    Eosinophils % 0.4 %    Basophils % 0.5 %    Neutrophils Absolute 7.2 1.7 - 7.7 K/uL    Lymphocytes Absolute 1.9 1.0 - 5.1 K/uL    Monocytes Absolute 0.5 0.0 - 1.3 K/uL    Eosinophils Absolute 0.0 0.0 - 0.6 K/uL    Basophils Absolute 0.0 0.0 - 0.2 K/uL   Comprehensive Metabolic Panel w/ Reflex to MG   Result Value Ref Range    Sodium 134 (L) 136 - 145 mmol/L    Potassium reflex Magnesium 4.2 3.5 - 5.1 mmol/L    Chloride 101 99 - 110 mmol/L    CO2 25 21 - 32 mmol/L    Anion Gap 8 3 - 16    Glucose 140 (H) 70 - 99 mg/dL    BUN 13 7 - 20 mg/dL    Creatinine 0.9 0.6 - 1.1 mg/dL    Est, Glom Filt Rate >60 >60    Calcium 10.4 8.3 - 10.6 mg/dL    Total Protein 7.4 6.4 - 8.2 g/dL    Albumin 4.7 3.4 - 5.0 g/dL    Albumin/Globulin Ratio 1.7 1.1 - 2.2    Total Bilirubin 0.7 0.0 - 1.0 mg/dL    Alkaline Phosphatase 91 40 - 129 U/L    ALT 21 10 - 40 U/L    AST 39 (H) 15 - 37 U/L   Troponin   Result Value Ref Range    Troponin <0.01 <0.01 ng/mL   hCG, serum, qualitative   Result Value Ref Range    hCG Qual Negative Detects HCG level >10 MIU/mL   Brain Natriuretic Peptide   Result Value Ref Range    Pro- 0 - 124 pg/mL   Blood gas, venous   Result Value Ref Range    pH, [place] 7.447 7.350 - 7.450    pCO2, [place] 39.5 (L) 40.0 - 50.0 mmHg    pO2, [place] 32.6 25.0 - 40.0 mmHg    HCO3, Venous 27.2 23.0 - 29.0 mmol/L    Base Excess, [place] 3.0 -3.0 - 3.0 mmol/L    O2 Sat, [place] 63 Not Established %    Carboxyhemoglobin 2.6 (H) 0.0 - 1.5 %    MetHgb, [place] 0.8 <1.5 %    TC02 (Calc), [place] 64 Not Established mmol/L    O2 Content, [place] 12 Not Established VOL %    O2 Therapy Unknown     Hemoglobin, [place], Reduced 36 %   D-Dimer, Quantitative   Result Value Ref Range    D-Dimer, Quant 0.43 0.00 - 0.60 ug/mL FEU   EKG 12 Lead   Result Value Ref Range    Ventricular Rate 52 BPM    Atrial Rate 52 BPM    P-R Interval 112 ms    QRS Duration 86 ms    Q-T Interval 474 ms    QTc Calculation (Bazett) 440 ms    P Axis 25 degrees    R Axis 35 degrees    T Axis 59 degrees    Diagnosis       ** Poor data quality, interpretation may be adversely affectedSinus bradycardia with sinus arrhythmiaOtherwise normal ECGConfirmed by [facility] Danay FORREST ([id number]) on 2/13/2023 1:43:35 PM         EKG performed in ED:  The 12 lead EKG was interpreted by me independent of a cardiologist as follows:  Rate: bradycardia with a rate of 52  Rhythm: sinus with sinus arrhythmia  Axis: normal  Intervals: normal MS, narrow QRS, normal QTc  ST segments: no ST elevations or depressions  T waves: no abnormal inversions  Non-specific T wave changes: not present  Prior EKG comparison: No prior is currently available for comparison    RADIOLOGY  Any applicable radiology studies including x-ray, CT, MRI, and/or ultrasound, were reviewed independently by me in addition to the radiologist.  I reviewed all radiology images and reports as well from this evaluation. XR CHEST PORTABLE    Result Date: 2/13/2023  No acute abnormality        ED COURSE/MDM  Patient seen and evaluated. Old records reviewed. Labs and imaging reviewed. After initial evaluation, differential diagnostic considerations included but not limited to: acute coronary syndrome, pulmonary embolism, COPD/asthma, pneumonia, sepsis, pericardial tamponade, pneumothorax, CHF, thoracic aortic dissection, anxiety    The patient's ED workup was notable for shortness of breath. The patient reported hypoxia based on a pulse oximeter prior to arrival however the accuracy of this is in question because it is a finger probe without waveform that could be monitored, also she has a history of Raynaud's and so the peripheral pulse oximeter may not have been entirely accurate if she was vasoconstricted at all. She has not been hypoxic whatsoever here. Intermittently she does have hyperventilating episodes and she self reports this is potentially anxiety related and panic attack.   She is low risk for PE, primarily with risk factor being travel by car, but without tachycardia and a negative D-dimer and clear chest x-ray this is definitively ruled out for now. The patient's troponin is negative and EKG is unremarkable so I do not suspect acute coronary syndrome as a cause of her shortness of breath. Infection was considered but she has no leukocytosis fever or infiltrate on imaging and no acute respiratory symptoms otherwise. Her BNP is 105 and she has no other signs of volume overload so I do not suspect acute heart failure. Her blood gas is unremarkable as well. Given the other conditions that have been ruled out as well as some improvement with benzodiazepines and Vistaril by mouth, I do feel that anxiety is likely and she has Xanax at home already and sufficient amount of hydroxyzine does not require refills per patient report. I did advise close PCP follow-up and return to the ED with any new or worsening symptoms or change in condition otherwise but do feel that discharge is reasonable as she has not been hypoxic at all here on multiple assessments. Is this patient to be included in the SEP-1 Core Measure? No   Exclusion criteria - the patient is NOT to be included for SEP-1 Core Measure due to: Infection is not suspected      Consults:  None    History obtained from: Patient    Pertinent social determinants of health: None applicable    Chronic conditions potentially affecting care:  Rayjaimeud's    Review of other records:  None    Reassessment:  See MDM for details of medications given and reassessment    During the patient's ED course, the patient was given:  Medications   LORazepam (ATIVAN) tablet 1 mg (1 mg Oral Given 2/13/23 1344)   hydrOXYzine pamoate (VISTARIL) capsule 50 mg (50 mg Oral Given 2/13/23 1428)        CLINICAL IMPRESSION  1. Shortness of breath    2. Feared condition not demonstrated        Blood pressure (!) 131/105, pulse 68, temperature 98 °F (36.7 °C), temperature source Oral, resp.  rate 18, last menstrual period 02/11/2023, SpO2 100 %. DISPOSITION  Salvatore Olson was discharged in stable condition. I have discussed the findings of today's workup with the patient and addressed the patient's questions and concerns. Important warning signs as well as new or worsening symptoms which would necessitate immediate return to the ED were discussed. The plan is to discharge from the ED at this time, and the patient is in stable condition. The patient acknowledged understanding is agreeable with this plan. Follow-up with:  Meagan Hester [facility]  [phone]    Schedule an appointment as soon as possible for a visit in 1 week  For symptom re-evaluation    [facility] Emergency Department  [address]  Go to   If symptoms worsen    Critical care time:  None    DISCLAIMER: This chart was created using Dragon dictation software. Efforts were made by me to ensure accuracy, however some errors may be present due to limitations of this technology and occasionally words are not transcribed correctly.         Nikole Moncada MD  02/13/23 4074

## 2023-02-13 NOTE — ED NOTES
Pt educated she is NPO until test results come back. Pt states \"Its just chest xray and blood work. I have been eating. \"  Additional blankets given to pt.      Caryle Ally, RN  02/13/23 3042

## 2023-02-13 NOTE — ED NOTES
Patient discharged  to home in stable condition via private car.  Discharge instructions reviewed with patient.   Patient verbalized understanding.   All belongings in tow including discharge paperwork.          Francisca Stock RN  02/13/23 0851

## 2023-02-13 NOTE — ED NOTES
Pt states \"Im having a panic attack, this is not like the other panic attacks I usually have. \" pt continues to be repetitive with this phrase. Patient alert and oriented x4. GCS 15/15. Skin appropriate for ethnicity, dry and intact. No signs of acute distress noted at this time. Hyperventilation noted. Pt is on a continuous pulse oximetry and telemetry monitoring. Bedside Monitor on with Alarms audible and alarms set. Pt continued on cycling blood pressure. Fall risk precautions in place, call light in reach, bed side table within reach, will continue to monitor.            Jerry Mullins RN  02/13/23 9219

## 2023-03-12 NOTE — PROCEDURE: COUNSELING
Erythromycin Counseling:  I discussed with the patient the risks of erythromycin including but not limited to GI upset, allergic reaction, drug rash, diarrhea, increase in liver enzymes, and yeast infections.
Benzoyl Peroxide Pregnancy And Lactation Text: This medication is Pregnancy Category C. It is unknown if benzoyl peroxide is excreted in breast milk.
Birth Control Pills Pregnancy And Lactation Text: This medication should be avoided if pregnant and for the first 30 days post-partum.
Tazorac Counseling:  Patient advised that medication is irritating and drying.  Patient may need to apply sparingly and wash off after an hour before eventually leaving it on overnight.  The patient verbalized understanding of the proper use and possible adverse effects of tazorac.  All of the patient's questions and concerns were addressed.
Topical Clindamycin Counseling: Patient counseled that this medication may cause skin irritation or allergic reactions.  In the event of skin irritation, the patient was advised to reduce the amount of the drug applied or use it less frequently.   The patient verbalized understanding of the proper use and possible adverse effects of clindamycin.  All of the patient's questions and concerns were addressed.
Spironolactone Pregnancy And Lactation Text: This medication can cause feminization of the male fetus and should be avoided during pregnancy. The active metabolite is also found in breast milk.
Isotretinoin Pregnancy And Lactation Text: This medication is Pregnancy Category X and is considered extremely dangerous during pregnancy. It is unknown if it is excreted in breast milk.
Azithromycin Counseling:  I discussed with the patient the risks of azithromycin including but not limited to GI upset, allergic reaction, drug rash, diarrhea, and yeast infections.
Spironolactone Counseling: Patient advised regarding risks of diarrhea, abdominal pain, hyperkalemia, birth defects (for female patients), liver toxicity and renal toxicity. The patient may need blood work to monitor liver and kidney function and potassium levels while on therapy. The patient verbalized understanding of the proper use and possible adverse effects of spironolactone.  All of the patient's questions and concerns were addressed.
Azithromycin Pregnancy And Lactation Text: This medication is considered safe during pregnancy and is also secreted in breast milk.
Dapsone Counseling: I discussed with the patient the risks of dapsone including but not limited to hemolytic anemia, agranulocytosis, rashes, methemoglobinemia, kidney failure, peripheral neuropathy, headaches, GI upset, and liver toxicity.  Patients who start dapsone require monitoring including baseline LFTs and weekly CBCs for the first month, then every month thereafter.  The patient verbalized understanding of the proper use and possible adverse effects of dapsone.  All of the patient's questions and concerns were addressed.
Dapsone Pregnancy And Lactation Text: This medication is Pregnancy Category C and is not considered safe during pregnancy or breast feeding.
Topical Retinoid Pregnancy And Lactation Text: This medication is Pregnancy Category C. It is unknown if this medication is excreted in breast milk.
Topical Retinoid counseling:  Patient advised to apply a pea-sized amount only at bedtime and wait 30 minutes after washing their face before applying.  If too drying, patient may add a non-comedogenic moisturizer. The patient verbalized understanding of the proper use and possible adverse effects of retinoids.  All of the patient's questions and concerns were addressed.
Bactrim Pregnancy And Lactation Text: This medication is Pregnancy Category D and is known to cause fetal risk.  It is also excreted in breast milk.
Topical Clindamycin Pregnancy And Lactation Text: This medication is Pregnancy Category B and is considered safe during pregnancy. It is unknown if it is excreted in breast milk.
Erythromycin Pregnancy And Lactation Text: This medication is Pregnancy Category B and is considered safe during pregnancy. It is also excreted in breast milk.
Minocycline Counseling: Patient advised regarding possible photosensitivity and discoloration of the teeth, skin, lips, tongue and gums.  Patient instructed to avoid sunlight, if possible.  When exposed to sunlight, patients should wear protective clothing, sunglasses, and sunscreen.  The patient was instructed to call the office immediately if the following severe adverse effects occur:  hearing changes, easy bruising/bleeding, severe headache, or vision changes.  The patient verbalized understanding of the proper use and possible adverse effects of minocycline.  All of the patient's questions and concerns were addressed.
Topical Sulfur Applications Counseling: Topical Sulfur Counseling: Patient counseled that this medication may cause skin irritation or allergic reactions.  In the event of skin irritation, the patient was advised to reduce the amount of the drug applied or use it less frequently.   The patient verbalized understanding of the proper use and possible adverse effects of topical sulfur application.  All of the patient's questions and concerns were addressed.
Tetracycline Counseling: Patient counseled regarding possible photosensitivity and increased risk for sunburn.  Patient instructed to avoid sunlight, if possible.  When exposed to sunlight, patients should wear protective clothing, sunglasses, and sunscreen.  The patient was instructed to call the office immediately if the following severe adverse effects occur:  hearing changes, easy bruising/bleeding, severe headache, or vision changes.  The patient verbalized understanding of the proper use and possible adverse effects of tetracycline.  All of the patient's questions and concerns were addressed. Patient understands to avoid pregnancy while on therapy due to potential birth defects.
Minocycline Pregnancy And Lactation Text: This medication is Pregnancy Category D and not consider safe during pregnancy. It is also excreted in breast milk.
Use Enhanced Medication Counseling?: No
Detail Level: Detailed
Isotretinoin Counseling: Patient should get monthly blood tests, not donate blood, not drive at night if vision affected, not share medication, and not undergo elective surgery for 6 months after tx completed. Side effects reviewed, pt to contact office should one occur.
High Dose Vitamin A Pregnancy And Lactation Text: High dose vitamin A therapy is contraindicated during pregnancy and breast feeding.
Topical Sulfur Applications Pregnancy And Lactation Text: This medication is Pregnancy Category C and has an unknown safety profile during pregnancy. It is unknown if this topical medication is excreted in breast milk.
Birth Control Pills Counseling: Birth Control Pill Counseling: I discussed with the patient the potential side effects of OCPs including but not limited to increased risk of stroke, heart attack, thrombophlebitis, deep venous thrombosis, hepatic adenomas, breast changes, GI upset, headaches, and depression.  The patient verbalized understanding of the proper use and possible adverse effects of OCPs. All of the patient's questions and concerns were addressed.
Doxycycline Counseling:  Patient counseled regarding possible photosensitivity and increased risk for sunburn.  Patient instructed to avoid sunlight, if possible.  When exposed to sunlight, patients should wear protective clothing, sunglasses, and sunscreen.  The patient was instructed to call the office immediately if the following severe adverse effects occur:  hearing changes, easy bruising/bleeding, severe headache, or vision changes.  The patient verbalized understanding of the proper use and possible adverse effects of doxycycline.  All of the patient's questions and concerns were addressed.
Benzoyl Peroxide Counseling: Patient counseled that medicine may cause skin irritation and bleach clothing.  In the event of skin irritation, the patient was advised to reduce the amount of the drug applied or use it less frequently.   The patient verbalized understanding of the proper use and possible adverse effects of benzoyl peroxide.  All of the patient's questions and concerns were addressed.
Tazorac Pregnancy And Lactation Text: This medication is not safe during pregnancy. It is unknown if this medication is excreted in breast milk.
High Dose Vitamin A Counseling: Side effects reviewed, pt to contact office should one occur.
Bactrim Counseling:  I discussed with the patient the risks of sulfa antibiotics including but not limited to GI upset, allergic reaction, drug rash, diarrhea, dizziness, photosensitivity, and yeast infections.  Rarely, more serious reactions can occur including but not limited to aplastic anemia, agranulocytosis, methemoglobinemia, blood dyscrasias, liver or kidney failure, lung infiltrates or desquamative/blistering drug rashes.
Doxycycline Pregnancy And Lactation Text: This medication is Pregnancy Category D and not consider safe during pregnancy. It is also excreted in breast milk but is considered safe for shorter treatment courses.
12-Mar-2023 06:24

## 2023-09-21 LAB — PAP SMEAR, EXTERNAL: NEGATIVE

## 2023-10-02 ENCOUNTER — OFFICE VISIT (OUTPATIENT)
Age: 43
End: 2023-10-02
Payer: COMMERCIAL

## 2023-10-02 VITALS
HEART RATE: 70 BPM | BODY MASS INDEX: 23.57 KG/M2 | TEMPERATURE: 97.7 F | OXYGEN SATURATION: 100 % | SYSTOLIC BLOOD PRESSURE: 116 MMHG | WEIGHT: 133 LBS | HEIGHT: 63 IN | DIASTOLIC BLOOD PRESSURE: 80 MMHG

## 2023-10-02 DIAGNOSIS — E03.9 HYPOTHYROIDISM, UNSPECIFIED TYPE: ICD-10-CM

## 2023-10-02 DIAGNOSIS — F51.01 PRIMARY INSOMNIA: ICD-10-CM

## 2023-10-02 DIAGNOSIS — Z13.220 LIPID SCREENING: ICD-10-CM

## 2023-10-02 DIAGNOSIS — G43.009 MIGRAINE WITHOUT AURA AND WITHOUT STATUS MIGRAINOSUS, NOT INTRACTABLE: ICD-10-CM

## 2023-10-02 DIAGNOSIS — Z23 IMMUNIZATION DUE: ICD-10-CM

## 2023-10-02 DIAGNOSIS — K21.9 GERD WITHOUT ESOPHAGITIS: ICD-10-CM

## 2023-10-02 DIAGNOSIS — E03.9 ACQUIRED HYPOTHYROIDISM: ICD-10-CM

## 2023-10-02 DIAGNOSIS — F41.9 ANXIETY: ICD-10-CM

## 2023-10-02 DIAGNOSIS — F33.1 MODERATE EPISODE OF RECURRENT MAJOR DEPRESSIVE DISORDER (HCC): Primary | ICD-10-CM

## 2023-10-02 PROCEDURE — 90674 CCIIV4 VAC NO PRSV 0.5 ML IM: CPT | Performed by: INTERNAL MEDICINE

## 2023-10-02 PROCEDURE — 99214 OFFICE O/P EST MOD 30 MIN: CPT | Performed by: INTERNAL MEDICINE

## 2023-10-02 PROCEDURE — 90471 IMMUNIZATION ADMIN: CPT | Performed by: INTERNAL MEDICINE

## 2023-10-02 RX ORDER — ONDANSETRON 4 MG/1
4 TABLET, FILM COATED ORAL EVERY 8 HOURS PRN
COMMUNITY

## 2023-10-02 RX ORDER — LEVOTHYROXINE SODIUM 0.03 MG/1
50 TABLET ORAL DAILY
Qty: 60 TABLET | Refills: 3 | Status: SHIPPED | OUTPATIENT
Start: 2023-10-02

## 2023-10-02 SDOH — ECONOMIC STABILITY: FOOD INSECURITY: WITHIN THE PAST 12 MONTHS, YOU WORRIED THAT YOUR FOOD WOULD RUN OUT BEFORE YOU GOT MONEY TO BUY MORE.: NEVER TRUE

## 2023-10-02 SDOH — ECONOMIC STABILITY: FOOD INSECURITY: WITHIN THE PAST 12 MONTHS, THE FOOD YOU BOUGHT JUST DIDN'T LAST AND YOU DIDN'T HAVE MONEY TO GET MORE.: NEVER TRUE

## 2023-10-02 SDOH — ECONOMIC STABILITY: HOUSING INSECURITY
IN THE LAST 12 MONTHS, WAS THERE A TIME WHEN YOU DID NOT HAVE A STEADY PLACE TO SLEEP OR SLEPT IN A SHELTER (INCLUDING NOW)?: NO

## 2023-10-02 SDOH — ECONOMIC STABILITY: INCOME INSECURITY: HOW HARD IS IT FOR YOU TO PAY FOR THE VERY BASICS LIKE FOOD, HOUSING, MEDICAL CARE, AND HEATING?: NOT VERY HARD

## 2023-10-02 ASSESSMENT — ENCOUNTER SYMPTOMS
VOMITING: 0
COUGH: 0
RHINORRHEA: 0
BLOOD IN STOOL: 0
SINUS PRESSURE: 0
BACK PAIN: 0
EYE PAIN: 0
SINUS PAIN: 0
SORE THROAT: 0
DIARRHEA: 0
EYE REDNESS: 0
WHEEZING: 0
CONSTIPATION: 0
NAUSEA: 0
SHORTNESS OF BREATH: 0
ABDOMINAL PAIN: 0

## 2023-10-02 ASSESSMENT — PATIENT HEALTH QUESTIONNAIRE - PHQ9
7. TROUBLE CONCENTRATING ON THINGS, SUCH AS READING THE NEWSPAPER OR WATCHING TELEVISION: 2
1. LITTLE INTEREST OR PLEASURE IN DOING THINGS: 1
SUM OF ALL RESPONSES TO PHQ QUESTIONS 1-9: 9
2. FEELING DOWN, DEPRESSED OR HOPELESS: 1
10. IF YOU CHECKED OFF ANY PROBLEMS, HOW DIFFICULT HAVE THESE PROBLEMS MADE IT FOR YOU TO DO YOUR WORK, TAKE CARE OF THINGS AT HOME, OR GET ALONG WITH OTHER PEOPLE: 0
SUM OF ALL RESPONSES TO PHQ QUESTIONS 1-9: 9
SUM OF ALL RESPONSES TO PHQ QUESTIONS 1-9: 9
DEPRESSION UNABLE TO ASSESS: FUNCTIONAL CAPACITY MOTIVATION LIMITS ACCURACY
6. FEELING BAD ABOUT YOURSELF - OR THAT YOU ARE A FAILURE OR HAVE LET YOURSELF OR YOUR FAMILY DOWN: 1
SUM OF ALL RESPONSES TO PHQ QUESTIONS 1-9: 9
9. THOUGHTS THAT YOU WOULD BE BETTER OFF DEAD, OR OF HURTING YOURSELF: 0
8. MOVING OR SPEAKING SO SLOWLY THAT OTHER PEOPLE COULD HAVE NOTICED. OR THE OPPOSITE, BEING SO FIGETY OR RESTLESS THAT YOU HAVE BEEN MOVING AROUND A LOT MORE THAN USUAL: 0
3. TROUBLE FALLING OR STAYING ASLEEP: 3
4. FEELING TIRED OR HAVING LITTLE ENERGY: 1
5. POOR APPETITE OR OVEREATING: 0
SUM OF ALL RESPONSES TO PHQ9 QUESTIONS 1 & 2: 2

## 2023-10-02 NOTE — PROGRESS NOTES
[facility]     Date of Visit:  10/2/2023  Patient Name: Ethan Davidson   Patient :  1980     CHIEF COMPLAINT:     Ethan Davidson is a 39 y.o. female who presents today for an general visit to be evaluated for the following condition(s):  Chief Complaint   Patient presents with    Follow-up       HISTORY OF PRESENT ILLNESS      Status post C6-7 fusion  at [place]. Doing well. Recent follow-up. She still notes some numbness of the second and third fingertips. Working as a dermatologist, having procedures. Occasionally notes a cramp in the arm. Right fifth finger paresthesias resolve. Occasional menstrual migraines. Treated with Nurtec or Amerge. And naratriptan. Now onYas PCP. 3 episodes of panic over the last 6 months. She plans to slowly wean off Xanax. Taking it currently 1-2 times per day. Lexapro down to 10 mg a day. Eventually would like implant her plantar frozen embryos. Doing [employer] work in [place]. REVIEW OF SYSTEMS     Review of Systems   Constitutional:  Negative for chills, fever and unexpected weight change. HENT:  Negative for congestion, ear pain, hearing loss, rhinorrhea, sinus pressure, sinus pain, sneezing and sore throat. Eyes:  Negative for pain, redness and visual disturbance. Respiratory:  Negative for cough, shortness of breath and wheezing. Cardiovascular:  Negative for chest pain, palpitations and leg swelling. Gastrointestinal:  Negative for abdominal pain, blood in stool, constipation, diarrhea, nausea and vomiting. Endocrine: Negative for cold intolerance and heat intolerance. Genitourinary:  Negative for difficulty urinating, dysuria, frequency, hematuria and urgency. Musculoskeletal:  Negative for arthralgias, back pain, gait problem, joint swelling, myalgias and neck pain. Skin:  Negative for rash and wound. Allergic/Immunologic: Negative for immunocompromised state.    Neurological:  Negative for dizziness,

## 2023-11-01 PROBLEM — Z13.220 LIPID SCREENING: Status: RESOLVED | Noted: 2023-10-02 | Resolved: 2023-11-01

## 2023-12-26 ENCOUNTER — OFFICE VISIT (OUTPATIENT)
Age: 43
End: 2023-12-26
Payer: COMMERCIAL

## 2023-12-26 VITALS
DIASTOLIC BLOOD PRESSURE: 80 MMHG | TEMPERATURE: 97.5 F | WEIGHT: 133 LBS | SYSTOLIC BLOOD PRESSURE: 116 MMHG | HEART RATE: 76 BPM | HEIGHT: 63 IN | BODY MASS INDEX: 23.57 KG/M2 | OXYGEN SATURATION: 97 %

## 2023-12-26 DIAGNOSIS — E03.9 ACQUIRED HYPOTHYROIDISM: ICD-10-CM

## 2023-12-26 DIAGNOSIS — F51.01 PRIMARY INSOMNIA: ICD-10-CM

## 2023-12-26 DIAGNOSIS — F33.1 MODERATE EPISODE OF RECURRENT MAJOR DEPRESSIVE DISORDER (HCC): ICD-10-CM

## 2023-12-26 DIAGNOSIS — F41.9 ANXIETY: ICD-10-CM

## 2023-12-26 DIAGNOSIS — G43.009 MIGRAINE WITHOUT AURA AND WITHOUT STATUS MIGRAINOSUS, NOT INTRACTABLE: ICD-10-CM

## 2023-12-26 DIAGNOSIS — Z87.39 HISTORY OF SCOLIOSIS: ICD-10-CM

## 2023-12-26 DIAGNOSIS — K21.9 GERD WITHOUT ESOPHAGITIS: ICD-10-CM

## 2023-12-26 DIAGNOSIS — E55.9 VITAMIN D DEFICIENCY: Primary | ICD-10-CM

## 2023-12-26 PROCEDURE — 99214 OFFICE O/P EST MOD 30 MIN: CPT | Performed by: INTERNAL MEDICINE

## 2023-12-26 RX ORDER — ESCITALOPRAM OXALATE 10 MG/1
10 TABLET ORAL DAILY
COMMUNITY

## 2023-12-26 RX ORDER — LEVOTHYROXINE SODIUM 0.07 MG/1
75 TABLET ORAL DAILY
COMMUNITY

## 2023-12-26 RX ORDER — ALPRAZOLAM 0.5 MG/1
0.5 TABLET ORAL NIGHTLY PRN
Qty: 60 TABLET | Refills: 0 | Status: SHIPPED | OUTPATIENT
Start: 2023-12-26 | End: 2024-02-24

## 2023-12-26 RX ORDER — ACETAMINOPHEN 80 MG/1
80 TABLET, CHEWABLE ORAL EVERY 4 HOURS PRN
COMMUNITY

## 2023-12-26 NOTE — PROGRESS NOTES
reactive to light. Neck:      Vascular: No carotid bruit. Cardiovascular:      Rate and Rhythm: Normal rate and regular rhythm. Pulses: Normal pulses. Heart sounds: No murmur heard. No friction rub. No gallop. Pulmonary:      Effort: Pulmonary effort is normal. No respiratory distress. Breath sounds: Normal breath sounds. No wheezing, rhonchi or rales. Abdominal:      General: Bowel sounds are normal. There is no distension. Palpations: Abdomen is soft. There is no mass. Tenderness: There is no abdominal tenderness. There is no guarding. Musculoskeletal:         General: No swelling or deformity. Normal range of motion. Cervical back: Normal range of motion and neck supple. No rigidity. Lymphadenopathy:      Cervical: No cervical adenopathy. Skin:     General: Skin is warm. Coloration: Skin is not jaundiced or pale. Findings: No bruising or rash. Neurological:      General: No focal deficit present. Mental Status: She is alert and oriented to person, place, and time. Cranial Nerves: No cranial nerve deficit. Motor: No weakness. Gait: Gait normal.      Deep Tendon Reflexes: Reflexes normal.   Psychiatric:         Mood and Affect: Mood normal.         Thought Content: Thought content normal.         The ASCVD Risk score (Maria Elena DK, et al., 2019) failed to calculate for the following reasons:    Cannot find a previous HDL lab    Cannot find a previous total cholesterol lab     Results for orders placed or performed in visit on 09/09/23    PAP SMEAR   Result Value Ref Range    PAP Smear, External Negative    Lipid Panel   Result Value Ref Range    Cholesterol, Total 175 mg/dL    HDL 68 35 - 70 mg/dL    LDL Calculated 100 0 - 160 mg/dL    Triglycerides 37 mg/dL    Chol/HDL Ratio 2.6     VLDL 7 mg/dL    Cholesterol non HDL        ASSESSMENT/PLAN     1. Vitamin D deficiency  Comments:  on vit D3, 1000u 3 daily. check level  2.  Acquired

## 2024-01-08 ENCOUNTER — TELEPHONE (OUTPATIENT)
Age: 44
End: 2024-01-08

## 2024-01-08 DIAGNOSIS — E03.9 ACQUIRED HYPOTHYROIDISM: Primary | ICD-10-CM

## 2024-01-08 NOTE — TELEPHONE ENCOUNTER
Patient would like to recheck her TSH in about 4 weeks. Patient started alternation of Levothyroxine from to 50 mcg to 75 mcg every other day for the past 2 weeks. Her TSH came back low.  Please Advise.    Please mail order when ready.

## 2024-02-02 ENCOUNTER — TELEPHONE (OUTPATIENT)
Age: 44
End: 2024-02-02

## 2024-02-02 DIAGNOSIS — F41.9 ANXIETY: ICD-10-CM

## 2024-02-02 RX ORDER — ALPRAZOLAM 0.5 MG/1
0.5 TABLET ORAL 2 TIMES DAILY PRN
Qty: 60 TABLET | Refills: 0 | Status: SHIPPED | OUTPATIENT
Start: 2024-02-02 | End: 2024-04-02

## 2024-02-02 NOTE — TELEPHONE ENCOUNTER
Please refill Alprazolam 0.5 MG. Would like the prescription directions to state take twice a day since she was in the Hospital with COVID and is having High Anxiety. She is being discharged form the Hospital today.Would Like the Alprazolam sent ASAP.    Please send to [facility] in [place].

## 2024-02-05 ENCOUNTER — TELEPHONE (OUTPATIENT)
Age: 44
End: 2024-02-05

## 2024-02-05 NOTE — TELEPHONE ENCOUNTER
Patient was discharged from [facility] on 02/02/24.  Cannot come in prior to her scheduled appointment of 02/16/24.

## 2024-02-06 NOTE — TELEPHONE ENCOUNTER
Care Transitions Initial Follow Up Call    Outreach made within 2 business days of discharge: Yes    Patient: Stephanie Cortés Patient : 1980   MRN: [id number]  Reason for Admission: There are no discharge diagnoses documented for the most recent discharge.  Discharge Date: 23       Spoke with: Patient    Discharge department/facility: [facility]    TCM Interactive Patient Contact:  Was patient able to fill all prescriptions: Yes  Was patient instructed to bring all medications to the follow-up visit: Yes  Is patient taking all medications as directed in the discharge summary? Yes  Does patient understand their discharge instructions: Yes  Does patient have questions or concerns that need addressed prior to 7-14 day follow up office visit: no    Scheduled appointment with PCP within 7-14 days    Follow Up  Future Appointments   Date Time Provider Department Center   2024 12:45 PM Nicki Cancino PA-C [facility]   2024  1:30 PM [place] [facility] [place] [place]

## 2024-02-16 ENCOUNTER — OFFICE VISIT (OUTPATIENT)
Age: 44
End: 2024-02-16

## 2024-02-16 VITALS
WEIGHT: 128.6 LBS | HEIGHT: 63 IN | HEART RATE: 80 BPM | DIASTOLIC BLOOD PRESSURE: 82 MMHG | SYSTOLIC BLOOD PRESSURE: 130 MMHG | BODY MASS INDEX: 22.79 KG/M2 | OXYGEN SATURATION: 98 %

## 2024-02-16 DIAGNOSIS — Z87.39 HISTORY OF SCOLIOSIS: ICD-10-CM

## 2024-02-16 DIAGNOSIS — F33.1 MODERATE EPISODE OF RECURRENT MAJOR DEPRESSIVE DISORDER (HCC): ICD-10-CM

## 2024-02-16 DIAGNOSIS — J18.9 PNEUMONIA OF BOTH LOWER LOBES DUE TO INFECTIOUS ORGANISM: ICD-10-CM

## 2024-02-16 DIAGNOSIS — F41.9 ANXIETY: ICD-10-CM

## 2024-02-16 DIAGNOSIS — U07.1 COVID-19: ICD-10-CM

## 2024-02-16 DIAGNOSIS — F51.01 PRIMARY INSOMNIA: ICD-10-CM

## 2024-02-16 DIAGNOSIS — G43.009 MIGRAINE WITHOUT AURA AND WITHOUT STATUS MIGRAINOSUS, NOT INTRACTABLE: ICD-10-CM

## 2024-02-16 DIAGNOSIS — E03.9 ACQUIRED HYPOTHYROIDISM: ICD-10-CM

## 2024-02-16 DIAGNOSIS — Z09 HOSPITAL DISCHARGE FOLLOW-UP: Primary | ICD-10-CM

## 2024-02-16 RX ORDER — ASCORBIC ACID 500 MG
500 TABLET ORAL DAILY
COMMUNITY

## 2024-02-16 RX ORDER — DROSPIRENONE AND ETHINYL ESTRADIOL 0.02-3(28)
1 KIT ORAL DAILY
COMMUNITY

## 2024-02-16 RX ORDER — CALCIUM CARBONATE/VITAMIN D3 600MG-62.5
CAPSULE ORAL
COMMUNITY

## 2024-02-16 RX ORDER — MILK THISTLE 150 MG
CAPSULE ORAL
COMMUNITY

## 2024-02-16 NOTE — PROGRESS NOTES
[facility]     Date of Visit:  2024  Patient Name: Stephanie Cortés   Patient :  1980     CHIEF COMPLAINT:     Stephanie Cortés is a 43 y.o. 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 We administered remdesivir, decadron and Rocephin/Azithro for elevated procalcitonin to 20. She quickly improved, and completed a five day course of CAP, as well as 5 days of remdesivir. On HD3, she developed acute prince with psychotic features that was likely related to steroids with a combination of lexapro that was initiated in her hospital stay. This improved withdrawal of offending medications. On HD4, she demonstrated improvement, but was speaking in a whisper, which was new. Her lfith of ideas and pressured speech were absent, but she was given three doses of ativan for possible catatonic features. On HD5, she was again alert and oriented x4 without delusions, paranoia and no internal stimulations. She was somewhat restless, but no longer speaking in a whisper and independently caring for her own needs. She lives with her parents, who were thankful to see her improvement. The psychiatry team felt patient was appropriate for discharge. Lexapro was discontinued. She will follow up with [facility] in February for counseling.       Patient had multifocal pneumonia and COVID.  She also had acute psychosis while in the hospital.  Psychiatry saw her and adjusted medicines while in the hospital and

## 2024-03-11 ENCOUNTER — TELEPHONE (OUTPATIENT)
Age: 44
End: 2024-03-11

## 2024-03-11 NOTE — TELEPHONE ENCOUNTER
Patient would like to know if you would still would like for her to get her XR that Nicki ordered. She stated that she went for a run and that her lungs are fine.Please Advise.    Patient would like for us to prescribe Natriptrian prn for her migraines.  Her Neurologist prescribed the medication before and she does not see him anymore.  Please send to [facility].

## 2024-03-18 NOTE — TELEPHONE ENCOUNTER
Stephanie Cortés is calling to request a refill on the following medication(s):    Last Visit Date (If Applicable):  12/26/2023    Next Visit Date:    4/4/2024    Medication Request:  Requested Prescriptions     Pending Prescriptions Disp Refills    NURTEC 75 MG TBDP [Pharmacy Med Name: NURTEC ODT 75 MG TABLET] 8 tablet      Sig: TAKE 1 TABLET (75MG) AND PLACE ON TONGUE. ALLOW TO DISSOLVE BY ORAL ROUTE ONCE AS NEEDED FOR MIGRAINE; MAX OF 1 DOSE PER 24 HOURS

## 2024-03-18 NOTE — TELEPHONE ENCOUNTER
Patient states that Pharmacy is going to send us electronically two medications. Natriptrian 75 MG Sublingual Aborted Therapy.    And Naratriptan 2.5 MG.    Patient will also ask about her XR on her next appointment.

## 2024-03-18 NOTE — TELEPHONE ENCOUNTER
Stephanie Cortés is calling to request a refill on the following medication(s):    Last Visit Date (If Applicable):  12/26/2023    Next Visit Date:    4/4/2024    Medication Request:  Requested Prescriptions     Pending Prescriptions Disp Refills    naratriptan (AMERGE) 2.5 MG tablet [Pharmacy Med Name: NARATRIPTAN HCL 2.5 MG TABLET] 12 tablet 1     Sig: take 1/2 tablet by mouth twice a day for 5 days FOR MENSTRUAL PROPAHYLAXIS, OR MAY USE AS NEEDED FOR ABORTIVE THERAPY 2 DAYS PER WEEK

## 2024-03-20 RX ORDER — RIMEGEPANT SULFATE 75 MG/75MG
TABLET, ORALLY DISINTEGRATING ORAL
Qty: 8 TABLET | Refills: 3 | Status: SHIPPED | OUTPATIENT
Start: 2024-03-20

## 2024-03-20 RX ORDER — NARATRIPTAN 2.5 MG/1
TABLET ORAL
Qty: 12 TABLET | Refills: 2 | Status: SHIPPED | OUTPATIENT
Start: 2024-03-20

## 2024-04-04 ENCOUNTER — OFFICE VISIT (OUTPATIENT)
Age: 44
End: 2024-04-04
Payer: COMMERCIAL

## 2024-04-04 VITALS
HEART RATE: 55 BPM | BODY MASS INDEX: 22.68 KG/M2 | HEIGHT: 63 IN | TEMPERATURE: 96.8 F | DIASTOLIC BLOOD PRESSURE: 80 MMHG | OXYGEN SATURATION: 99 % | WEIGHT: 128 LBS | SYSTOLIC BLOOD PRESSURE: 116 MMHG

## 2024-04-04 DIAGNOSIS — F51.01 PRIMARY INSOMNIA: ICD-10-CM

## 2024-04-04 DIAGNOSIS — F41.9 ANXIETY: ICD-10-CM

## 2024-04-04 DIAGNOSIS — E03.9 ACQUIRED HYPOTHYROIDISM: ICD-10-CM

## 2024-04-04 DIAGNOSIS — F33.1 MODERATE EPISODE OF RECURRENT MAJOR DEPRESSIVE DISORDER (HCC): ICD-10-CM

## 2024-04-04 DIAGNOSIS — E53.1 VITAMIN B6 DEFICIENCY: ICD-10-CM

## 2024-04-04 DIAGNOSIS — Z87.39 HISTORY OF SCOLIOSIS: ICD-10-CM

## 2024-04-04 DIAGNOSIS — K21.9 GERD WITHOUT ESOPHAGITIS: ICD-10-CM

## 2024-04-04 DIAGNOSIS — E83.41 HYPERMAGNESEMIA: ICD-10-CM

## 2024-04-04 DIAGNOSIS — E60 ZINC DEFICIENCY: ICD-10-CM

## 2024-04-04 DIAGNOSIS — E55.9 VITAMIN D DEFICIENCY: Primary | ICD-10-CM

## 2024-04-04 DIAGNOSIS — Z86.16 HISTORY OF COVID-19: ICD-10-CM

## 2024-04-04 DIAGNOSIS — G43.009 MIGRAINE WITHOUT AURA AND WITHOUT STATUS MIGRAINOSUS, NOT INTRACTABLE: ICD-10-CM

## 2024-04-04 PROCEDURE — 99214 OFFICE O/P EST MOD 30 MIN: CPT | Performed by: INTERNAL MEDICINE

## 2024-04-04 RX ORDER — ESCITALOPRAM OXALATE 10 MG/1
5 TABLET ORAL DAILY
Qty: 30 TABLET | Status: SHIPPED | COMMUNITY
Start: 2024-04-04

## 2024-04-04 RX ORDER — TRAZODONE HYDROCHLORIDE 50 MG/1
50 TABLET ORAL NIGHTLY
COMMUNITY

## 2024-04-04 NOTE — PROGRESS NOTES
[facility]     Date of Visit:  2024  Patient Name: Stephanie Cortés   Patient :  1980     CHIEF COMPLAINT:     Stephanie Cortés is a 43 y.o. female who presents today for an general visit to be evaluated for the following condition(s):  No chief complaint on file.      HISTORY OF PRESENT ILLNESS       Plans to get a position at [employer].  Considering  implantation of her Embryos' in [place]. Precepting. Wt down 5 lb.      On levothyroxine 50 alternating with 75. Last TSH 0.25 .   Didn't get her Cxr. Occas cough. Walk/Running.3.5 miles  Back to baseline per pt. [employer] ended in [place]. .     Still on supplement s/p covid, but plans to d/c these.  Also on finasteride 5 mg daily.  She plans to stop this in 1 month.     Using amerge or nurtec prn every few weeks.      Plans to get a breast MRI. Last mammogram OK. Claustrophic.  As left over xanax for this.   REVIEW OF SYSTEMS     Review of Systems   Constitutional:  Negative for chills, fever and unexpected weight change.   HENT:  Negative for congestion, ear pain, hearing loss, rhinorrhea, sinus pressure, sinus pain, sneezing and sore throat.    Eyes:  Negative for pain, redness and visual disturbance.   Respiratory:  Negative for cough, shortness of breath and wheezing.    Cardiovascular:  Negative for chest pain, palpitations and leg swelling.   Gastrointestinal:  Negative for abdominal pain, blood in stool, constipation, diarrhea, nausea and vomiting.   Endocrine: Negative for cold intolerance and heat intolerance.   Genitourinary:  Negative for difficulty urinating, dysuria, frequency, hematuria and urgency.   Musculoskeletal:  Negative for arthralgias, back pain, gait problem, joint swelling, myalgias and neck pain.   Skin:  Negative for rash and wound.   Allergic/Immunologic: Negative for immunocompromised state.   Neurological:  Negative for dizziness, tremors, seizures, syncope, speech difficulty, weakness, light-headedness, numbness and

## 2024-04-17 ENCOUNTER — TELEPHONE (OUTPATIENT)
Age: 44
End: 2024-04-17

## 2024-04-17 DIAGNOSIS — E55.9 VITAMIN D DEFICIENCY: ICD-10-CM

## 2024-04-17 DIAGNOSIS — E53.1 VITAMIN B6 DEFICIENCY: ICD-10-CM

## 2024-04-17 DIAGNOSIS — E83.41 HYPERMAGNESEMIA: ICD-10-CM

## 2024-04-17 DIAGNOSIS — Z86.16 HISTORY OF COVID-19: ICD-10-CM

## 2024-04-17 DIAGNOSIS — E60 ZINC DEFICIENCY: ICD-10-CM

## 2024-04-17 DIAGNOSIS — E03.9 ACQUIRED HYPOTHYROIDISM: ICD-10-CM

## 2024-04-17 DIAGNOSIS — R91.1 LUNG NODULE: Primary | ICD-10-CM

## 2024-04-17 NOTE — TELEPHONE ENCOUNTER
Patient called and would like the results of the chest Xray. Please call the patient    Stephanie  [phone]

## 2024-04-18 NOTE — TELEPHONE ENCOUNTER
Labs are fine.    CXR wasn't compare to  last CXR's.  6 mm nodule left upper lung  Mild small airway disease or mild bronchitis, but no pneumonia.   CT chest at [facility] in Jan made no mention of any nodule. Recommend CT chest without contrast. Order printed.

## 2024-04-18 NOTE — TELEPHONE ENCOUNTER
Please call patient on results from addended XR from 4/12/24 that is in chart. Patient spoke to radiologist about Chest XR today and a comparison XR from 02/13/23 that was sent from [facility].   She states that it might not be anything that they found, but she was going to have the CT Chest done. Please Advise.

## 2024-04-18 NOTE — TELEPHONE ENCOUNTER
Patient called and was informed that Dr Sales ordered CT Chest w/o contrast. Per patient's request order was faxed to [facility].

## 2024-04-19 NOTE — TELEPHONE ENCOUNTER
Discussed with patient.  She has views of the chest x-ray 1 year ago and will bring it to the radiologist that did the recent chest x-ray and asked for comparison.  Please fax order for comparison study.  If there is no change in the nodule then okay to cancel the CT chest in 3 months.    There appears to be a change in recommend that she do the CT chest without contrast at the same site where she had the last chest x-ray.

## 2024-04-19 NOTE — TELEPHONE ENCOUNTER
Left voice male message asking patient to get the CT chest done to evaluate for possible pulmonary nodule.

## 2024-04-19 NOTE — TELEPHONE ENCOUNTER
Patient wanted to know what is the chance of developing a 5 mm opacity since she was in the hospital with COVID.   She states that the CT she had in 2023 did not report the opacity. She spoke to the radiologist and they discovered that is was there.Patient has the images of CT and XR on disc. She will still get the CT that we ordered.

## 2024-04-23 RX ORDER — LEVOTHYROXINE SODIUM 0.05 MG/1
50 TABLET ORAL EVERY OTHER DAY
Qty: 45 TABLET | Refills: 1 | Status: SHIPPED | OUTPATIENT
Start: 2024-04-23

## 2024-04-23 NOTE — TELEPHONE ENCOUNTER
Stephanie Cortés is calling to request a refill on the following medication(s):    Last Visit Date (If Applicable):  2/16/2024    Next Visit Date:    07/15/2024    Medication Request:  Requested Prescriptions     Pending Prescriptions Disp Refills    levothyroxine (SYNTHROID) 50 MCG tablet [Pharmacy Med Name: LEVOTHYROXINE 50 MCG TABLET] 90 tablet 2     Sig: take 1 tablet by mouth every morning ON AN EMPTY STOMACH

## 2024-05-03 DIAGNOSIS — J18.9 PNEUMONIA OF BOTH LOWER LOBES DUE TO INFECTIOUS ORGANISM: ICD-10-CM

## 2024-05-06 ENCOUNTER — TELEPHONE (OUTPATIENT)
Age: 44
End: 2024-05-06

## 2024-05-06 NOTE — TELEPHONE ENCOUNTER
Patient is having an Embryo Transfer in a few months and is wondering if it is safe to get CT chest if she is pregnant.  If not can she get the CT early?    Please call patient @ [phone]     May leave a detailed message

## 2024-05-08 ENCOUNTER — HOSPITAL ENCOUNTER (OUTPATIENT)
Dept: RADIOLOGY | Facility: HOSPITAL | Age: 44
Discharge: HOME | End: 2024-05-08
Payer: MEDICAID

## 2024-05-08 DIAGNOSIS — R92.2 INCONCLUSIVE MAMMOGRAM: ICD-10-CM

## 2024-05-08 PROCEDURE — 6100000003 BI MR BREAST BILATERAL WITH CONTRAST FAST SCREENING SELF PAY: Mod: RCN

## 2024-05-08 PROCEDURE — 2550000001 HC RX 255 CONTRASTS

## 2024-05-08 PROCEDURE — A9575 INJ GADOTERATE MEGLUMI 0.1ML: HCPCS

## 2024-05-08 RX ORDER — GADOTERATE MEGLUMINE 376.9 MG/ML
0.2 INJECTION INTRAVENOUS
Status: COMPLETED | OUTPATIENT
Start: 2024-05-08 | End: 2024-05-08

## 2024-05-08 RX ADMIN — GADOTERATE MEGLUMINE 11.3 ML: 376.9 INJECTION INTRAVENOUS at 12:25

## 2024-05-09 ENCOUNTER — TELEPHONE (OUTPATIENT)
Age: 44
End: 2024-05-09

## 2024-05-09 DIAGNOSIS — F41.9 ANXIETY: ICD-10-CM

## 2024-05-09 RX ORDER — ALPRAZOLAM 0.5 MG/1
0.5 TABLET ORAL NIGHTLY PRN
Qty: 60 TABLET | Refills: 0 | Status: SHIPPED | OUTPATIENT
Start: 2024-05-09 | End: 2024-07-08

## 2024-05-09 NOTE — TELEPHONE ENCOUNTER
Stephanie Cortés is calling to request a refill on the following medication(s):    Medication Request:  Requested Prescriptions     Pending Prescriptions Disp Refills    ALPRAZolam (XANAX) 0.5 MG tablet 60 tablet 0     Sig: Take 1 tablet by mouth nightly as needed for Sleep for up to 60 days. Max Daily Amount: 0.5 mg       Last Visit Date (If Applicable):  4/4/2024    Next Visit Date:    7/15/2024                Pt  Returned from ct scan     Alee Rivera RN  10/30/17 [id number]

## 2024-05-09 NOTE — TELEPHONE ENCOUNTER
Patient would like a refill on Alprazolam .5 MG prn. She states that Nicki ordered the medication for her back in February. I did not see it on her medication list.   Please send to [facility] in [place].    Patient would like medication sent ASA, she has to have drug testing before her new job starts.

## 2024-06-06 ENCOUNTER — TELEPHONE (OUTPATIENT)
Age: 44
End: 2024-06-06

## 2024-06-06 NOTE — TELEPHONE ENCOUNTER
Spoke to [facility] and they need more Clinical Information. They would like a Peer to Peer with one of their physicians.  Put patient's Information on Dr Sales's Desk.

## 2024-06-06 NOTE — TELEPHONE ENCOUNTER
Marla from [facility] [employer] states that patient insurance needs more information to approve her CT Chest.  Please call Beau at [phone].  [facility] NPI [id number] where CT Chest will be performed.

## 2024-06-10 NOTE — TELEPHONE ENCOUNTER
CT approved DR Sales spoke to [facility]. AUTH # [id number]. Auth till 07/05/24.

## 2024-06-12 DIAGNOSIS — R91.1 LUNG NODULE: ICD-10-CM

## 2024-06-12 DIAGNOSIS — Z86.16 HISTORY OF COVID-19: ICD-10-CM

## 2024-06-23 DIAGNOSIS — G43.009 MIGRAINE WITHOUT AURA AND WITHOUT STATUS MIGRAINOSUS, NOT INTRACTABLE: Primary | ICD-10-CM

## 2024-06-25 NOTE — TELEPHONE ENCOUNTER
Patient wanted to let us know that she is not pregnant, incase migraine medication was not ordered. She states there was a discussion at her last office visit.   She also stated that she saw her [facility] Chest on [facility] and the results can be discussed at next office visit.

## 2024-06-26 NOTE — TELEPHONE ENCOUNTER
Stephanie Cortés is calling to request a refill on the following medication(s):    Last Visit Date (If Applicable):  4/4/2024    Next Visit Date:    7/15/2024    Medication Request:  Requested Prescriptions     Pending Prescriptions Disp Refills    naratriptan (AMERGE) 2.5 MG tablet [Pharmacy Med Name: NARATRIPTAN HCL 2.5 MG TABLET] 12 tablet 2     Sig: take 1/2 tablet by mouth twice a day for 5 days FOR MENSTRUAL PROPAHYLAXIS, OR MAY USE AS NEEDED FOR ABORTIVE THERAPY 2 DAYS PER WEEK

## 2024-06-27 RX ORDER — NARATRIPTAN 2.5 MG/1
2.5 TABLET ORAL
Qty: 12 TABLET | Refills: 2 | Status: SHIPPED | OUTPATIENT
Start: 2024-06-27 | End: 2024-06-27

## 2024-06-30 DIAGNOSIS — G43.009 MIGRAINE WITHOUT AURA AND WITHOUT STATUS MIGRAINOSUS, NOT INTRACTABLE: Primary | ICD-10-CM

## 2024-07-01 RX ORDER — RIMEGEPANT SULFATE 75 MG/75MG
TABLET, ORALLY DISINTEGRATING ORAL
Qty: 8 TABLET | Refills: 3 | Status: SHIPPED | OUTPATIENT
Start: 2024-07-01 | End: 2024-08-09 | Stop reason: SDUPTHER

## 2024-07-01 NOTE — TELEPHONE ENCOUNTER
Stephanie Cortés is calling to request a refill on the following medication(s):    Last Visit Date (If Applicable):  4/4/2024    Next Visit Date:    7/15/2024    Medication Request:  Requested Prescriptions     Pending Prescriptions Disp Refills    NURTEC 75 MG TBDP [Pharmacy Med Name: NURTEC ODT 75 MG TABLET] 8 tablet 3     Sig: TAKE 1 TABLET (75MG) AND PLACE ON TONGUE. ALLOW TO DISSOLVE BY ORAL ROUTE ONCE AS NEEDED FOR MIGRAINE; MAX OF 1 DOSE PER 24 HOURS

## 2024-07-11 DIAGNOSIS — F41.9 ANXIETY: ICD-10-CM

## 2024-07-11 RX ORDER — ALPRAZOLAM 0.5 MG
TABLET ORAL
Qty: 60 TABLET | Refills: 0 | Status: SHIPPED | OUTPATIENT
Start: 2024-07-11 | End: 2024-08-08 | Stop reason: SDUPTHER

## 2024-07-15 ENCOUNTER — OFFICE VISIT (OUTPATIENT)
Age: 44
End: 2024-07-15
Payer: COMMERCIAL

## 2024-07-15 VITALS
TEMPERATURE: 98 F | HEART RATE: 54 BPM | SYSTOLIC BLOOD PRESSURE: 130 MMHG | OXYGEN SATURATION: 99 % | HEIGHT: 62 IN | WEIGHT: 134 LBS | BODY MASS INDEX: 24.66 KG/M2 | DIASTOLIC BLOOD PRESSURE: 85 MMHG

## 2024-07-15 DIAGNOSIS — Z86.16 HISTORY OF COVID-19: ICD-10-CM

## 2024-07-15 DIAGNOSIS — Z87.39 HISTORY OF SCOLIOSIS: ICD-10-CM

## 2024-07-15 DIAGNOSIS — K21.9 GERD WITHOUT ESOPHAGITIS: ICD-10-CM

## 2024-07-15 DIAGNOSIS — F41.9 ANXIETY: ICD-10-CM

## 2024-07-15 DIAGNOSIS — E03.9 ACQUIRED HYPOTHYROIDISM: ICD-10-CM

## 2024-07-15 DIAGNOSIS — F33.1 MODERATE EPISODE OF RECURRENT MAJOR DEPRESSIVE DISORDER (HCC): ICD-10-CM

## 2024-07-15 DIAGNOSIS — G43.009 MIGRAINE WITHOUT AURA AND WITHOUT STATUS MIGRAINOSUS, NOT INTRACTABLE: ICD-10-CM

## 2024-07-15 DIAGNOSIS — F51.01 PRIMARY INSOMNIA: ICD-10-CM

## 2024-07-15 DIAGNOSIS — E55.9 VITAMIN D DEFICIENCY: ICD-10-CM

## 2024-07-15 DIAGNOSIS — R19.7 DIARRHEA, UNSPECIFIED TYPE: Primary | ICD-10-CM

## 2024-07-15 DIAGNOSIS — E60 ZINC DEFICIENCY: ICD-10-CM

## 2024-07-15 PROCEDURE — 99214 OFFICE O/P EST MOD 30 MIN: CPT | Performed by: INTERNAL MEDICINE

## 2024-07-15 RX ORDER — DESOGESTREL AND ETHINYL ESTRADIOL 0.15-0.03
1 KIT ORAL DAILY
COMMUNITY

## 2024-07-15 RX ORDER — TRAZODONE HYDROCHLORIDE 150 MG/1
150 TABLET ORAL NIGHTLY
COMMUNITY

## 2024-07-15 RX ORDER — LEVOTHYROXINE SODIUM 0.07 MG/1
75 TABLET ORAL DAILY
Qty: 45 TABLET | Refills: 3 | Status: SHIPPED | OUTPATIENT
Start: 2024-07-15

## 2024-07-15 ASSESSMENT — ENCOUNTER SYMPTOMS
SINUS PRESSURE: 0
SORE THROAT: 0
SINUS PAIN: 0
CONSTIPATION: 0
SHORTNESS OF BREATH: 0
VOMITING: 0
RHINORRHEA: 0
NAUSEA: 0
DIARRHEA: 0
BLOOD IN STOOL: 0
EYE PAIN: 0
ABDOMINAL PAIN: 0
EYE REDNESS: 0
WHEEZING: 0
BACK PAIN: 0
COUGH: 0

## 2024-07-15 NOTE — PROGRESS NOTES
[facility]     Date of Visit:  7/15/2024  Patient Name: Stephanie Cortés   Patient :  1980     CHIEF COMPLAINT:     Stephanie Cortés is a 43 y.o. female who presents today for an general visit to be evaluated for the following condition(s):  Chief Complaint   Patient presents with    3 Month Follow-Up     Covid in January and still having diarrhea.        HISTORY OF PRESENT ILLNESS       Pt had a mild covid infection 1 month ago.  PT had the novavax in May. Bad diarrhea during the  covid infeciton -  better with zyrtec and zantac bid per derm colleague.  Immodium without relief. No blood. Never had a colonscopy. Wt stable, but was 112 after the covid infection. No international travels. Was on azithromycin and cephalosporin in [facility]. C diff neg in the past.   BM soft now 2x/day, better. Was 5x/day. Taking priobiotic 60 billion daily after the 1st covid.      CT chest with 3 mm nodule.    Resumed lexapro at 10 mg qd for depression. No worsening of diarrhea.     Increased trazodone to 150 mg at HS with good sleep.     Using xanax 0.5 mg prn panic or at most 1 at HS if stressed.     Seeing her therapist q wk.     Waiting to get approved at the [facility] or might be working in [facility] or might be going to [employer].  Embryo's on hold.         REVIEW OF SYSTEMS     Review of Systems   Constitutional:  Negative for chills, fever and unexpected weight change.   HENT:  Negative for congestion, ear pain, hearing loss, rhinorrhea, sinus pressure, sinus pain, sneezing and sore throat.    Eyes:  Negative for pain, redness and visual disturbance.   Respiratory:  Negative for cough, shortness of breath and wheezing.    Cardiovascular:  Negative for chest pain, palpitations and leg swelling.   Gastrointestinal:  Negative for abdominal pain, blood in stool, constipation, diarrhea, nausea and vomiting.   Endocrine: Negative for cold intolerance and heat intolerance.   Genitourinary:  Negative for difficulty

## 2024-07-25 ENCOUNTER — TELEPHONE (OUTPATIENT)
Age: 44
End: 2024-07-25

## 2024-07-25 DIAGNOSIS — R19.7 DIARRHEA, UNSPECIFIED TYPE: Primary | ICD-10-CM

## 2024-07-25 NOTE — TELEPHONE ENCOUNTER
Patient needs a referral to have a colonoscopy done.  She is going to:  [facility]                             Dr. Eleazar Gray                                         Ph# [phone]                             [address]                Fax# [phone]                              [place] [id number]      Please fax referral to physician and mail a copy to Stephanie.

## 2024-08-05 DIAGNOSIS — G43.009 MIGRAINE WITHOUT AURA AND WITHOUT STATUS MIGRAINOSUS, NOT INTRACTABLE: ICD-10-CM

## 2024-08-05 NOTE — TELEPHONE ENCOUNTER
Stephanie Cortés is calling to request a refill on the following medication(s):    Last Visit Date (If Applicable):  7/15/2024    Next Visit Date:    10/17/2024    Medication Request:  Requested Prescriptions      No prescriptions requested or ordered in this encounter            Naratriptan 2.5 mg take one tablet prn for migraine headache

## 2024-08-05 NOTE — TELEPHONE ENCOUNTER
Patient needs medication sent to [facility] in [place], [place]    Naratriptan  2.5 mg  1 tablet prn migraine

## 2024-08-06 RX ORDER — NARATRIPTAN 2.5 MG/1
2.5 TABLET ORAL
Qty: 12 TABLET | Refills: 2 | Status: SHIPPED | OUTPATIENT
Start: 2024-08-06 | End: 2024-08-06

## 2024-08-08 ENCOUNTER — TELEPHONE (OUTPATIENT)
Age: 44
End: 2024-08-08

## 2024-08-08 DIAGNOSIS — G43.009 MIGRAINE WITHOUT AURA AND WITHOUT STATUS MIGRAINOSUS, NOT INTRACTABLE: ICD-10-CM

## 2024-08-08 DIAGNOSIS — F41.9 ANXIETY: ICD-10-CM

## 2024-08-08 NOTE — TELEPHONE ENCOUNTER
Stephanie Cortés is calling to request a refill on the following medication(s):    Last Visit Date (If Applicable):  7/15/2024    Next Visit Date:    10/18/2024    Medication Request:  Requested Prescriptions     Pending Prescriptions Disp Refills    ALPRAZolam (XANAX) 0.5 MG tablet 30 tablet 0     Sig: Take 1 tablet by mouth nightly as needed for Sleep for up to 30 days. Max Daily Amount: 0.5 mg

## 2024-08-08 NOTE — TELEPHONE ENCOUNTER
Please send prescription to  [facility] in [place].    Alprazolam 0.5 mg  1 qd prn  Nurtec 75 mg TBDP  1 prn migraine

## 2024-08-09 ENCOUNTER — OFFICE (OUTPATIENT)
Dept: URBAN - METROPOLITAN AREA CLINIC 26 | Facility: CLINIC | Age: 44
End: 2024-08-09
Payer: COMMERCIAL

## 2024-08-09 VITALS
WEIGHT: 138 LBS | HEIGHT: 63 IN | SYSTOLIC BLOOD PRESSURE: 134 MMHG | TEMPERATURE: 98.7 F | HEART RATE: 68 BPM | DIASTOLIC BLOOD PRESSURE: 83 MMHG

## 2024-08-09 DIAGNOSIS — K21.9 GASTRO-ESOPHAGEAL REFLUX DISEASE WITHOUT ESOPHAGITIS: ICD-10-CM

## 2024-08-09 DIAGNOSIS — E61.1 IRON DEFICIENCY: ICD-10-CM

## 2024-08-09 DIAGNOSIS — R19.4 CHANGE IN BOWEL HABIT: ICD-10-CM

## 2024-08-09 PROCEDURE — 99204 OFFICE O/P NEW MOD 45 MIN: CPT | Performed by: INTERNAL MEDICINE

## 2024-08-09 RX ORDER — RIMEGEPANT SULFATE 75 MG/75MG
TABLET, ORALLY DISINTEGRATING ORAL
Qty: 8 TABLET | Refills: 3 | Status: SHIPPED | OUTPATIENT
Start: 2024-08-09

## 2024-08-09 RX ORDER — ALPRAZOLAM 0.5 MG/1
0.5 TABLET ORAL NIGHTLY PRN
Qty: 30 TABLET | Refills: 2 | Status: SHIPPED | OUTPATIENT
Start: 2024-08-09 | End: 2024-11-07

## 2024-08-11 DIAGNOSIS — F41.9 ANXIETY: ICD-10-CM

## 2024-08-12 RX ORDER — ALPRAZOLAM 0.5 MG/1
TABLET ORAL
Qty: 60 TABLET | OUTPATIENT
Start: 2024-08-12

## 2024-08-13 PROBLEM — K31.89 OTHER DISEASES OF STOMACH AND DUODENUM: Status: ACTIVE | Noted: 2024-08-13

## 2024-08-13 PROBLEM — K44.9 DIAPHRAGMATIC HERNIA WITHOUT OBSTRUCTION OR GANGRENE: Status: ACTIVE | Noted: 2024-08-13

## 2024-08-13 PROBLEM — K22.89 OTHER SPECIFIED DISEASE OF ESOPHAGUS: Status: ACTIVE | Noted: 2024-08-13

## 2024-08-22 DIAGNOSIS — R19.7 DIARRHEA, UNSPECIFIED TYPE: ICD-10-CM

## 2024-08-27 ENCOUNTER — TELEPHONE (OUTPATIENT)
Age: 44
End: 2024-08-27

## 2024-08-27 DIAGNOSIS — G43.009 MIGRAINE WITHOUT AURA AND WITHOUT STATUS MIGRAINOSUS, NOT INTRACTABLE: ICD-10-CM

## 2024-08-27 NOTE — TELEPHONE ENCOUNTER
Stephanie Cortés is calling to request a refill on the following medication(s):    Last Visit Date (If Applicable):  7/15/2024    Next Visit Date:    10/18/2024    Medication Request:  Requested Prescriptions     Pending Prescriptions Disp Refills    rimegepant sulfate (NURTEC) 75 MG TBDP 8 tablet 3     Sig: TAKE 1 TABLET (75MG) AND PLACE ON TONGUE. ALLOW TO DISSOLVE BY ORAL ROUTE ONCE AS NEEDED FOR MIGRAINE; MAX OF 1 DOSE PER 24 HOURS

## 2024-08-28 RX ORDER — RIMEGEPANT SULFATE 75 MG/75MG
TABLET, ORALLY DISINTEGRATING ORAL
Qty: 8 TABLET | Refills: 3 | Status: SHIPPED | OUTPATIENT
Start: 2024-08-28

## 2024-09-10 ENCOUNTER — TELEPHONE (OUTPATIENT)
Age: 44
End: 2024-09-10

## 2024-09-10 DIAGNOSIS — E03.9 ACQUIRED HYPOTHYROIDISM: Primary | ICD-10-CM

## 2024-09-11 RX ORDER — LEVOTHYROXINE SODIUM 50 UG/1
50 TABLET ORAL EVERY OTHER DAY
Qty: 45 TABLET | Refills: 3 | Status: SHIPPED | OUTPATIENT
Start: 2024-09-11

## 2024-09-30 DIAGNOSIS — G43.009 MIGRAINE WITHOUT AURA AND WITHOUT STATUS MIGRAINOSUS, NOT INTRACTABLE: ICD-10-CM

## 2024-10-02 RX ORDER — RIMEGEPANT SULFATE 75 MG/75MG
TABLET, ORALLY DISINTEGRATING ORAL
Qty: 8 TABLET | Refills: 3 | Status: SHIPPED | OUTPATIENT
Start: 2024-10-02

## 2024-10-18 ENCOUNTER — OFFICE VISIT (OUTPATIENT)
Age: 44
End: 2024-10-18
Payer: COMMERCIAL

## 2024-10-18 VITALS
DIASTOLIC BLOOD PRESSURE: 76 MMHG | HEART RATE: 60 BPM | OXYGEN SATURATION: 99 % | BODY MASS INDEX: 24.48 KG/M2 | SYSTOLIC BLOOD PRESSURE: 130 MMHG | HEIGHT: 62 IN | WEIGHT: 133 LBS | TEMPERATURE: 97.2 F

## 2024-10-18 DIAGNOSIS — F33.1 MODERATE EPISODE OF RECURRENT MAJOR DEPRESSIVE DISORDER (HCC): ICD-10-CM

## 2024-10-18 DIAGNOSIS — F51.01 PRIMARY INSOMNIA: ICD-10-CM

## 2024-10-18 DIAGNOSIS — Z86.16 HISTORY OF COVID-19: ICD-10-CM

## 2024-10-18 DIAGNOSIS — E55.9 VITAMIN D DEFICIENCY: ICD-10-CM

## 2024-10-18 DIAGNOSIS — E03.9 ACQUIRED HYPOTHYROIDISM: ICD-10-CM

## 2024-10-18 DIAGNOSIS — E60 ZINC DEFICIENCY: ICD-10-CM

## 2024-10-18 DIAGNOSIS — R19.7 DIARRHEA, UNSPECIFIED TYPE: Primary | ICD-10-CM

## 2024-10-18 DIAGNOSIS — F41.9 ANXIETY: ICD-10-CM

## 2024-10-18 DIAGNOSIS — G43.009 MIGRAINE WITHOUT AURA AND WITHOUT STATUS MIGRAINOSUS, NOT INTRACTABLE: ICD-10-CM

## 2024-10-18 DIAGNOSIS — Z87.39 HISTORY OF SCOLIOSIS: ICD-10-CM

## 2024-10-18 DIAGNOSIS — K21.9 GERD WITHOUT ESOPHAGITIS: ICD-10-CM

## 2024-10-18 PROCEDURE — 1036F TOBACCO NON-USER: CPT | Performed by: INTERNAL MEDICINE

## 2024-10-18 PROCEDURE — G8484 FLU IMMUNIZE NO ADMIN: HCPCS | Performed by: INTERNAL MEDICINE

## 2024-10-18 PROCEDURE — G8420 CALC BMI NORM PARAMETERS: HCPCS | Performed by: INTERNAL MEDICINE

## 2024-10-18 PROCEDURE — G8427 DOCREV CUR MEDS BY ELIG CLIN: HCPCS | Performed by: INTERNAL MEDICINE

## 2024-10-18 PROCEDURE — 99214 OFFICE O/P EST MOD 30 MIN: CPT | Performed by: INTERNAL MEDICINE

## 2024-10-18 RX ORDER — NARATRIPTAN 2.5 MG/1
2.5 TABLET ORAL
Qty: 12 TABLET | Refills: 2 | Status: SHIPPED | OUTPATIENT
Start: 2024-10-18 | End: 2024-10-18

## 2024-10-18 RX ORDER — ESCITALOPRAM OXALATE 10 MG/1
20 TABLET ORAL DAILY
Qty: 90 TABLET | Refills: 1 | Status: SHIPPED | OUTPATIENT
Start: 2024-10-18

## 2024-10-18 RX ORDER — LEVOTHYROXINE SODIUM 75 UG/1
75 TABLET ORAL DAILY
Qty: 45 TABLET | Refills: 3 | Status: SHIPPED | OUTPATIENT
Start: 2024-10-18

## 2024-10-18 RX ORDER — TRAZODONE HYDROCHLORIDE 150 MG/1
150 TABLET ORAL NIGHTLY
Qty: 90 TABLET | Refills: 1 | Status: SHIPPED | OUTPATIENT
Start: 2024-10-18

## 2024-10-18 RX ORDER — RIMEGEPANT SULFATE 75 MG/75MG
TABLET, ORALLY DISINTEGRATING ORAL
Qty: 8 TABLET | Refills: 3 | Status: SHIPPED | OUTPATIENT
Start: 2024-10-18

## 2024-10-18 RX ORDER — LEVOTHYROXINE SODIUM 50 UG/1
50 TABLET ORAL EVERY OTHER DAY
Qty: 45 TABLET | Refills: 3 | Status: SHIPPED | OUTPATIENT
Start: 2024-10-18

## 2024-10-18 RX ORDER — FERROUS SULFATE 325(65) MG
325 TABLET ORAL
COMMUNITY

## 2024-10-18 SDOH — ECONOMIC STABILITY: FOOD INSECURITY: WITHIN THE PAST 12 MONTHS, THE FOOD YOU BOUGHT JUST DIDN'T LAST AND YOU DIDN'T HAVE MONEY TO GET MORE.: NEVER TRUE

## 2024-10-18 SDOH — ECONOMIC STABILITY: INCOME INSECURITY: HOW HARD IS IT FOR YOU TO PAY FOR THE VERY BASICS LIKE FOOD, HOUSING, MEDICAL CARE, AND HEATING?: NOT VERY HARD

## 2024-10-18 SDOH — ECONOMIC STABILITY: FOOD INSECURITY: WITHIN THE PAST 12 MONTHS, YOU WORRIED THAT YOUR FOOD WOULD RUN OUT BEFORE YOU GOT MONEY TO BUY MORE.: NEVER TRUE

## 2024-10-18 ASSESSMENT — ENCOUNTER SYMPTOMS
NAUSEA: 0
VOMITING: 0
ABDOMINAL PAIN: 0
SINUS PRESSURE: 0
SHORTNESS OF BREATH: 0
DIARRHEA: 0
BACK PAIN: 0
WHEEZING: 0
BLOOD IN STOOL: 0
CONSTIPATION: 0
SORE THROAT: 0
RHINORRHEA: 0
COUGH: 0
EYE REDNESS: 0
SINUS PAIN: 0
EYE PAIN: 0

## 2024-10-18 NOTE — PROGRESS NOTES
[facility]     Date of Visit:  10/18/2024  Patient Name: Stephanie Cortés   Patient :  1980     CHIEF COMPLAINT:     Stephanie Cortés is a 44 y.o. female who presents today for an general visit to be evaluated for the following condition(s):  Chief Complaint   Patient presents with    3 Month Follow-Up    Medication Refill     Most of her medication, but needs them printed, moved to [place]         HISTORY OF PRESENT ILLNESS      [facility] the [facility] in [place], 5d/wk. Reads derm path, does telederm, precepts the residents.   Small HH and nonspecific gastritis. H pylori neg  EGD and colonoscopy 2024: int and ext hemor, celiac studies neg. No evid of C diff.     Still on Zyrtec and zantac bid for diarrhea. Freq BM 2-3x/day after covid. Some are not well formed. Using baby wipe. Not worse with stress.     Started on FeSO4 daily.    Eats yogurt daily.    Doing some weight, walks at work.     REVIEW OF SYSTEMS     Review of Systems   Constitutional:  Negative for chills, fever and unexpected weight change.   HENT:  Negative for congestion, ear pain, hearing loss, rhinorrhea, sinus pressure, sinus pain, sneezing and sore throat.    Eyes:  Negative for pain, redness and visual disturbance.   Respiratory:  Negative for cough, shortness of breath and wheezing.    Cardiovascular:  Negative for chest pain, palpitations and leg swelling.   Gastrointestinal:  Negative for abdominal pain, blood in stool, constipation, diarrhea, nausea and vomiting.   Endocrine: Negative for cold intolerance and heat intolerance.   Genitourinary:  Negative for difficulty urinating, dysuria, frequency, hematuria and urgency.   Musculoskeletal:  Negative for arthralgias, back pain, gait problem, joint swelling, myalgias and neck pain.   Skin:  Negative for rash and wound.   Allergic/Immunologic: Negative for immunocompromised state.   Neurological:  Negative for dizziness, tremors, seizures, syncope, speech difficulty, weakness,

## 2024-10-23 DIAGNOSIS — G43.009 MIGRAINE WITHOUT AURA AND WITHOUT STATUS MIGRAINOSUS, NOT INTRACTABLE: ICD-10-CM

## 2024-10-23 DIAGNOSIS — E03.9 ACQUIRED HYPOTHYROIDISM: ICD-10-CM

## 2024-10-23 DIAGNOSIS — F41.9 ANXIETY: ICD-10-CM

## 2024-10-23 DIAGNOSIS — F51.01 PRIMARY INSOMNIA: ICD-10-CM

## 2024-10-23 DIAGNOSIS — F33.1 MODERATE EPISODE OF RECURRENT MAJOR DEPRESSIVE DISORDER (HCC): Primary | ICD-10-CM

## 2024-10-23 NOTE — TELEPHONE ENCOUNTER
Please refill Naratriptan 2.5 MG Takes PRN.  Patient did not use the script that was given to her at last office visit.She said its easier for us to send, since pharmacy is 30 minutes.    Please send to [facility].

## 2024-10-23 NOTE — TELEPHONE ENCOUNTER
Please refill the following medications:  Patient was given paper orders to fill medications, but due to her insurance she needs them to go to [facility].    Levothyroxine 50 MCG Takes every other day. #45  Levothyroxine 75 MCG Takes every other day. #45    Trazodone 150 MG. Takes at Bedtime. #90    Lexapro 20 MG Takes once a day.  #90    Nurtec 75 MG Takes PRN.     Please send to [facility].

## 2024-10-24 RX ORDER — TRAZODONE HYDROCHLORIDE 150 MG/1
150 TABLET ORAL NIGHTLY
Qty: 90 TABLET | Refills: 1 | OUTPATIENT
Start: 2024-10-24

## 2024-10-24 RX ORDER — ESCITALOPRAM OXALATE 10 MG/1
20 TABLET ORAL DAILY
Qty: 90 TABLET | Refills: 1 | OUTPATIENT
Start: 2024-10-24

## 2024-10-24 RX ORDER — LEVOTHYROXINE SODIUM 50 UG/1
50 TABLET ORAL EVERY OTHER DAY
Qty: 45 TABLET | Refills: 3 | OUTPATIENT
Start: 2024-10-24

## 2024-10-24 RX ORDER — NARATRIPTAN 2.5 MG/1
2.5 TABLET ORAL
Qty: 12 TABLET | Refills: 2 | OUTPATIENT
Start: 2024-10-24 | End: 2024-10-24

## 2024-10-24 RX ORDER — RIMEGEPANT SULFATE 75 MG/75MG
TABLET, ORALLY DISINTEGRATING ORAL
Qty: 8 TABLET | Refills: 3 | OUTPATIENT
Start: 2024-10-24

## 2024-10-24 RX ORDER — LEVOTHYROXINE SODIUM 75 UG/1
75 TABLET ORAL DAILY
Qty: 45 TABLET | Refills: 3 | OUTPATIENT
Start: 2024-10-24

## 2024-10-24 NOTE — TELEPHONE ENCOUNTER
Stephanie Cortés is calling to request a refill on the following medication(s):    Last Visit Date (If Applicable):  10/18/2024    Next Visit Date:    10/23/2024    Medication Request:  Requested Prescriptions     Pending Prescriptions Disp Refills    naratriptan (AMERGE) 2.5 MG tablet 12 tablet 2     Sig: Take 1 tablet by mouth once as needed for Migraine

## 2024-10-24 NOTE — TELEPHONE ENCOUNTER
Stephanie Cortés is calling to request a refill on the following medication(s):    Last Visit Date (If Applicable):  10/18/2024    Next Visit Date:    4/18/2025    Medication Request:  Requested Prescriptions     Pending Prescriptions Disp Refills    levothyroxine (SYNTHROID) 50 MCG tablet 45 tablet 3     Sig: Take 1 tablet by mouth every other day Take on an empty stomach.    levothyroxine (SYNTHROID) 75 MCG tablet 45 tablet 3     Sig: Take 1 tablet by mouth Daily Alternating with 50 mcg    traZODone (DESYREL) 150 MG tablet 90 tablet 1     Sig: Take 1 tablet by mouth nightly    escitalopram (LEXAPRO) 10 MG tablet 90 tablet 1     Sig: Take 2 tablets by mouth daily    rimegepant sulfate (NURTEC) 75 MG TBDP 8 tablet 3     Sig: TAKE 1 TABLET (75MG) AND PLACE ON TONGUE. ALLOW TO DISSOLVE BY ORAL ROUTE ONCE AS NEEDED FOR MIGRAINE; MAX OF 1 DOSE PER 24 HOURS

## 2024-11-01 ENCOUNTER — PATIENT MESSAGE (OUTPATIENT)
Age: 44
End: 2024-11-01

## 2024-11-04 DIAGNOSIS — E03.9 ACQUIRED HYPOTHYROIDISM: ICD-10-CM

## 2024-11-04 DIAGNOSIS — G43.009 MIGRAINE WITHOUT AURA AND WITHOUT STATUS MIGRAINOSUS, NOT INTRACTABLE: ICD-10-CM

## 2024-11-04 DIAGNOSIS — F33.1 MODERATE EPISODE OF RECURRENT MAJOR DEPRESSIVE DISORDER (HCC): Primary | ICD-10-CM

## 2024-11-04 DIAGNOSIS — F51.01 PRIMARY INSOMNIA: ICD-10-CM

## 2024-11-04 NOTE — TELEPHONE ENCOUNTER
levothyroxine (SYNTHROID) 75 MCG tablet  Take 1 tablet by mouth Daily Alternating with 50 mcg, Disp-45 tablet, R-3  Print, Last Dose: Not Recorded       levothyroxine (SYNTHROID) 50 MCG tablet  Take 1 tablet by mouth every other day Take on an empty stomach., Disp-45 tablet, R-3  Print, Last Dose: Not Recorded      escitalopram (LEXAPRO) 10 MG tablet  Take 2 tablets by mouth daily, Disp-90 tablet, R-1  Print, Last Dose: Not Recorded      rimegepant sulfate (NURTEC) 75 MG TBDP  TAKE 1 TABLET (75MG) AND PLACE ON TONGUE. ALLOW TO DISSOLVE BY ORAL ROUTE ONCE AS NEEDED FOR MIGRAINE; MAX OF 1 DOSE PER 24 HOURS, D       traZODone (DESYREL) 150 MG tablet  Take 1 tablet by mouth nightly, Disp-90 tablet, R-1     naratriptan (AMERGE) 2.5 MG tablet  Take 1 tablet by mouth once as needed for Migraine, Disp-12 tablet, R-2  Print, Last D

## 2024-11-05 RX ORDER — LEVOTHYROXINE SODIUM 50 UG/1
50 TABLET ORAL EVERY OTHER DAY
Qty: 45 TABLET | Refills: 3 | Status: SHIPPED | OUTPATIENT
Start: 2024-11-05

## 2024-11-05 RX ORDER — NARATRIPTAN 2.5 MG/1
2.5 TABLET ORAL
Qty: 12 TABLET | Refills: 2 | Status: SHIPPED | OUTPATIENT
Start: 2024-11-05 | End: 2024-11-05

## 2024-11-05 RX ORDER — TRAZODONE HYDROCHLORIDE 150 MG/1
150 TABLET ORAL NIGHTLY
Qty: 90 TABLET | Refills: 1 | Status: SHIPPED | OUTPATIENT
Start: 2024-11-05

## 2024-11-05 RX ORDER — LEVOTHYROXINE SODIUM 75 UG/1
75 TABLET ORAL DAILY
Qty: 45 TABLET | Refills: 3 | Status: SHIPPED | OUTPATIENT
Start: 2024-11-05 | End: 2024-11-07 | Stop reason: SDUPTHER

## 2024-11-05 RX ORDER — RIMEGEPANT SULFATE 75 MG/75MG
TABLET, ORALLY DISINTEGRATING ORAL
Qty: 8 TABLET | Refills: 3 | Status: SHIPPED | OUTPATIENT
Start: 2024-11-05

## 2024-11-05 RX ORDER — ESCITALOPRAM OXALATE 10 MG/1
20 TABLET ORAL DAILY
Qty: 90 TABLET | Refills: 1 | Status: SHIPPED | OUTPATIENT
Start: 2024-11-05

## 2024-11-07 DIAGNOSIS — E03.9 ACQUIRED HYPOTHYROIDISM: ICD-10-CM

## 2024-11-07 RX ORDER — LEVOTHYROXINE SODIUM 75 UG/1
TABLET ORAL
Qty: 45 TABLET | Refills: 3 | Status: SHIPPED | OUTPATIENT
Start: 2024-11-07

## 2024-11-09 DIAGNOSIS — G43.009 MIGRAINE WITHOUT AURA AND WITHOUT STATUS MIGRAINOSUS, NOT INTRACTABLE: ICD-10-CM

## 2024-11-11 RX ORDER — NARATRIPTAN 2.5 MG/1
TABLET ORAL
Qty: 18 TABLET | Refills: 0 | OUTPATIENT
Start: 2024-11-11

## 2024-11-12 DIAGNOSIS — G43.009 MIGRAINE WITHOUT AURA AND WITHOUT STATUS MIGRAINOSUS, NOT INTRACTABLE: ICD-10-CM

## 2024-11-12 RX ORDER — NARATRIPTAN 2.5 MG/1
2.5 TABLET ORAL
Qty: 10 TABLET | Refills: 3 | Status: SHIPPED | OUTPATIENT
Start: 2024-11-12 | End: 2024-11-12

## 2024-11-12 RX ORDER — RIMEGEPANT SULFATE 75 MG/75MG
TABLET, ORALLY DISINTEGRATING ORAL
Qty: 10 TABLET | Refills: 3 | Status: SHIPPED | OUTPATIENT
Start: 2024-11-12

## 2024-11-12 NOTE — TELEPHONE ENCOUNTER
Stephanie Cortés is calling to request a refill on the following medication(s):    Last Visit Date (If Applicable):  10/18/2024    Next Visit Date:    4/18/2025    Medication Request:  Requested Prescriptions     Pending Prescriptions Disp Refills    naratriptan (AMERGE) 2.5 MG tablet 36 tablet 0     Sig: Take 1 tablet by mouth once as needed for Migraine    rimegepant sulfate (NURTEC) 75 MG TBDP 24 tablet 0     Sig: TAKE 1 TABLET (75MG) AND PLACE ON TONGUE. ALLOW TO DISSOLVE BY ORAL ROUTE ONCE AS NEEDED FOR MIGRAINE; MAX OF 1 DOSE PER 24 HOURS

## 2024-12-02 DIAGNOSIS — E03.9 ACQUIRED HYPOTHYROIDISM: ICD-10-CM

## 2024-12-02 NOTE — TELEPHONE ENCOUNTER
Stephanie Cortés is calling to request a refill on the following medication(s):    Last Visit Date (If Applicable):  10/18/2024    Next Visit Date:    4/18/2025    Medication Request:  Requested Prescriptions     Pending Prescriptions Disp Refills    levothyroxine (SYNTHROID) 50 MCG tablet 45 tablet 3     Sig: Take 1 tablet by mouth every other day Take on an empty stomach.

## 2024-12-04 DIAGNOSIS — E03.9 ACQUIRED HYPOTHYROIDISM: ICD-10-CM

## 2024-12-04 RX ORDER — LEVOTHYROXINE SODIUM 50 UG/1
50 TABLET ORAL EVERY OTHER DAY
Qty: 45 TABLET | Refills: 3 | Status: SHIPPED | OUTPATIENT
Start: 2024-12-04

## 2024-12-04 RX ORDER — LEVOTHYROXINE SODIUM 50 UG/1
50 TABLET ORAL DAILY
Qty: 15 TABLET | Refills: 3 | Status: SHIPPED | OUTPATIENT
Start: 2024-12-04

## 2024-12-04 NOTE — TELEPHONE ENCOUNTER
Stephanie Cortés is calling to request a refill on the following medication(s):    Last Visit Date (If Applicable):  10/18/2024    Next Visit Date:    4/18/2025    Medication Request:  Requested Prescriptions     Pending Prescriptions Disp Refills    levothyroxine (SYNTHROID) 50 MCG tablet [Pharmacy Med Name: Levothyroxine Sodium Oral Tablet 50 MCG] 15 tablet 0     Sig: TAKE ONE TABLET BY MOUTH EVERY OTHER DAY ON EMPTY STOMACH

## 2025-02-26 DIAGNOSIS — G43.009 MIGRAINE WITHOUT AURA AND WITHOUT STATUS MIGRAINOSUS, NOT INTRACTABLE: ICD-10-CM

## 2025-02-26 RX ORDER — NARATRIPTAN 2.5 MG/1
TABLET ORAL
Qty: 10 TABLET | Refills: 3 | Status: SHIPPED | OUTPATIENT
Start: 2025-02-26

## 2025-02-26 NOTE — TELEPHONE ENCOUNTER
Stephanie Cortés is calling to request a refill on the following medication(s):    Last Visit Date (If Applicable):  10/18/2024    Next Visit Date:    4/18/2025    Medication Request:  Requested Prescriptions     Pending Prescriptions Disp Refills    naratriptan (AMERGE) 2.5 MG tablet [Pharmacy Med Name: Naratriptan HCl Oral Tablet 2.5 MG] 10 tablet 0     Sig: TAKE ONE TABLET BY MOUTH ONCE AS NEEDED FOR MIGRAINE.

## 2025-03-20 ENCOUNTER — TELEPHONE (OUTPATIENT)
Age: 45
End: 2025-03-20

## 2025-03-20 DIAGNOSIS — M54.12 LEFT CERVICAL RADICULOPATHY: Primary | ICD-10-CM

## 2025-03-20 NOTE — TELEPHONE ENCOUNTER
Patient would like an MRI of the cervical spine. Patient is experiencing cramping of left arm, numbness and tingling of the left hand. Please mail to patient when ready.  Offered appointment, patient has upcoming appointment in April. Lives in [place].

## 2025-03-25 ENCOUNTER — TELEPHONE (OUTPATIENT)
Age: 45
End: 2025-03-25

## 2025-03-25 DIAGNOSIS — M54.12 CERVICAL RADICULOPATHY: Primary | ICD-10-CM

## 2025-03-25 NOTE — TELEPHONE ENCOUNTER
Patient would like a month's supply of Gabapentin until she sees Dr Sales on 04/18/25. Please send Gabapentin 600 mg tid prn. Patient still having pain.    Please send to [facility].

## 2025-03-26 RX ORDER — GABAPENTIN 300 MG/1
CAPSULE ORAL
Qty: 30 CAPSULE | Refills: 0 | Status: SHIPPED | OUTPATIENT
Start: 2025-04-02 | End: 2025-05-02

## 2025-03-26 RX ORDER — GABAPENTIN 100 MG/1
CAPSULE ORAL
Qty: 9 CAPSULE | Refills: 0 | Status: SHIPPED | OUTPATIENT
Start: 2025-03-26 | End: 2025-04-01

## 2025-03-26 NOTE — TELEPHONE ENCOUNTER
Patient informed.  Patient states that she was given Gabapentin back in June of 2023 when she had right arm surgery. She states that she has left arm pain and she is having the MRI done next month. She says her left arm is stable. She said she has been using a steroid and ibuprofen. She just remembers that Gabapentin helped take pain away. She cannot remember what does exactly the dose was.

## 2025-03-26 NOTE — TELEPHONE ENCOUNTER
Ask pt if she is already taking gabapentin? The dosage she is requesting is quite high and I usually start on a much lower dose to avoid side effects.

## 2025-04-15 ENCOUNTER — RESULTS FOLLOW-UP (OUTPATIENT)
Age: 45
End: 2025-04-15

## 2025-04-15 DIAGNOSIS — M54.12 LEFT CERVICAL RADICULOPATHY: ICD-10-CM

## 2025-04-17 NOTE — RESULT ENCOUNTER NOTE
Called Patient left message for Pt. To call the office back.   Patient has appointment her tomorrow 4/18/2025 with Dr. Sales

## 2025-05-27 DIAGNOSIS — G43.009 MIGRAINE WITHOUT AURA AND WITHOUT STATUS MIGRAINOSUS, NOT INTRACTABLE: ICD-10-CM

## 2025-05-27 RX ORDER — NARATRIPTAN 2.5 MG/1
2.5 TABLET ORAL
Qty: 12 TABLET | Refills: 3 | Status: SHIPPED | OUTPATIENT
Start: 2025-05-27

## 2025-05-27 NOTE — TELEPHONE ENCOUNTER
Patient needs refill sent To [facility] on [address] in [place], [place]   Comes in pack of nine.  Dr Sales usually calls in #12.  Either qty is okay.         naratriptan (AMERGE) 2.5 MG tablet

## 2025-05-27 NOTE — TELEPHONE ENCOUNTER
Stephanie Cortés is calling to request a refill on the following medication(s):    Last Visit Date (If Applicable):  10/18/2024    Next Visit Date:    6/5/2025    Medication Request:  Requested Prescriptions     Pending Prescriptions Disp Refills    naratriptan (AMERGE) 2.5 MG tablet 12 tablet 3     Sig: Take 1 tablet by mouth once as needed for Migraine 2.5 mg at onset of headache, may repeat in 4 hours if needed

## 2025-06-04 DIAGNOSIS — F33.1 MODERATE EPISODE OF RECURRENT MAJOR DEPRESSIVE DISORDER (HCC): ICD-10-CM

## 2025-06-05 RX ORDER — ESCITALOPRAM OXALATE 10 MG/1
20 TABLET ORAL DAILY
Qty: 90 TABLET | Refills: 1 | Status: SHIPPED | OUTPATIENT
Start: 2025-06-05

## 2025-06-05 NOTE — TELEPHONE ENCOUNTER
Stephanie Cortés is calling to request a refill on the following medication(s):    Last Visit Date (If Applicable):  10/18/2024    Next Visit Date:    6/27/2025    Medication Request:  Requested Prescriptions     Pending Prescriptions Disp Refills    escitalopram (LEXAPRO) 10 MG tablet [Pharmacy Med Name: Escitalopram Oxalate Oral Tablet 10 MG] 90 tablet 0     Sig: TAKE TWO TABLETS BY MOUTH DAILY

## 2025-08-05 DIAGNOSIS — F41.9 ANXIETY: Primary | ICD-10-CM

## 2025-08-05 RX ORDER — ALPRAZOLAM 0.5 MG
0.5 TABLET ORAL 3 TIMES DAILY PRN
Qty: 30 TABLET | Refills: 0 | Status: SHIPPED | OUTPATIENT
Start: 2025-08-05 | End: 2025-08-19

## 2025-08-07 ENCOUNTER — OFFICE VISIT (OUTPATIENT)
Age: 45
End: 2025-08-07
Payer: OTHER GOVERNMENT

## 2025-08-07 VITALS
WEIGHT: 139 LBS | HEART RATE: 65 BPM | BODY MASS INDEX: 25.58 KG/M2 | SYSTOLIC BLOOD PRESSURE: 100 MMHG | TEMPERATURE: 97.5 F | OXYGEN SATURATION: 99 % | DIASTOLIC BLOOD PRESSURE: 70 MMHG | HEIGHT: 62 IN

## 2025-08-07 DIAGNOSIS — F33.1 MODERATE EPISODE OF RECURRENT MAJOR DEPRESSIVE DISORDER (HCC): ICD-10-CM

## 2025-08-07 DIAGNOSIS — K21.9 GERD WITHOUT ESOPHAGITIS: ICD-10-CM

## 2025-08-07 DIAGNOSIS — Z86.16 HISTORY OF COVID-19: ICD-10-CM

## 2025-08-07 DIAGNOSIS — Z87.39 HISTORY OF SCOLIOSIS: ICD-10-CM

## 2025-08-07 DIAGNOSIS — G43.009 MIGRAINE WITHOUT AURA AND WITHOUT STATUS MIGRAINOSUS, NOT INTRACTABLE: ICD-10-CM

## 2025-08-07 DIAGNOSIS — F41.9 ANXIETY: Primary | ICD-10-CM

## 2025-08-07 DIAGNOSIS — F51.01 PRIMARY INSOMNIA: ICD-10-CM

## 2025-08-07 DIAGNOSIS — E60 ZINC DEFICIENCY: ICD-10-CM

## 2025-08-07 DIAGNOSIS — E55.9 VITAMIN D DEFICIENCY: ICD-10-CM

## 2025-08-07 DIAGNOSIS — E03.9 ACQUIRED HYPOTHYROIDISM: ICD-10-CM

## 2025-08-07 DIAGNOSIS — Z13.220 LIPID SCREENING: ICD-10-CM

## 2025-08-07 PROCEDURE — 99214 OFFICE O/P EST MOD 30 MIN: CPT | Performed by: INTERNAL MEDICINE

## 2025-08-07 RX ORDER — TRAZODONE HYDROCHLORIDE 150 MG/1
150 TABLET ORAL NIGHTLY
Qty: 90 TABLET | Refills: 1 | Status: SHIPPED | OUTPATIENT
Start: 2025-08-07

## 2025-08-07 RX ORDER — ESCITALOPRAM OXALATE 10 MG/1
20 TABLET ORAL DAILY
Qty: 180 TABLET | Refills: 1 | Status: SHIPPED | OUTPATIENT
Start: 2025-08-07

## 2025-08-07 ASSESSMENT — ENCOUNTER SYMPTOMS
EYE REDNESS: 0
SINUS PRESSURE: 0
SINUS PAIN: 0
VOMITING: 0
BLOOD IN STOOL: 0
BACK PAIN: 0
DIARRHEA: 0
WHEEZING: 0
ABDOMINAL PAIN: 0
SORE THROAT: 0
RHINORRHEA: 0
SHORTNESS OF BREATH: 0
NAUSEA: 0
EYE PAIN: 0
COUGH: 0
CONSTIPATION: 0

## 2025-08-10 ENCOUNTER — PATIENT MESSAGE (OUTPATIENT)
Age: 45
End: 2025-08-10

## 2025-08-11 DIAGNOSIS — F41.9 ANXIETY: ICD-10-CM

## 2025-08-11 RX ORDER — ALPRAZOLAM 0.5 MG
0.5 TABLET ORAL 3 TIMES DAILY PRN
Qty: 30 TABLET | Refills: 0 | Status: CANCELLED | OUTPATIENT
Start: 2025-08-11 | End: 2025-08-25

## 2025-08-12 DIAGNOSIS — F41.9 ANXIETY: ICD-10-CM

## 2025-08-12 RX ORDER — ALPRAZOLAM 0.5 MG
0.5 TABLET ORAL 3 TIMES DAILY PRN
Qty: 30 TABLET | Refills: 0 | Status: SHIPPED | OUTPATIENT
Start: 2025-08-12 | End: 2025-08-13 | Stop reason: SDUPTHER

## 2025-08-13 ENCOUNTER — TELEPHONE (OUTPATIENT)
Age: 45
End: 2025-08-13

## 2025-08-13 DIAGNOSIS — F41.9 ANXIETY: ICD-10-CM

## 2025-08-13 RX ORDER — ALPRAZOLAM 0.5 MG
0.5 TABLET ORAL 3 TIMES DAILY PRN
Qty: 30 TABLET | Refills: 0 | Status: SHIPPED | OUTPATIENT
Start: 2025-08-13 | End: 2025-08-27